# Patient Record
Sex: FEMALE | Race: OTHER | Employment: STUDENT | URBAN - METROPOLITAN AREA
[De-identification: names, ages, dates, MRNs, and addresses within clinical notes are randomized per-mention and may not be internally consistent; named-entity substitution may affect disease eponyms.]

---

## 2019-04-11 ENCOUNTER — HOSPITAL ENCOUNTER (EMERGENCY)
Facility: HOSPITAL | Age: 29
Discharge: HOME | End: 2019-04-11
Payer: COMMERCIAL

## 2019-04-11 ENCOUNTER — APPOINTMENT (OUTPATIENT)
Dept: ULTRASOUND IMAGING | Facility: HOSPITAL | Age: 29
End: 2019-04-11
Attending: NURSE PRACTITIONER
Payer: COMMERCIAL

## 2019-04-11 VITALS
OXYGEN SATURATION: 98 % | TEMPERATURE: 99 F | HEART RATE: 70 BPM | SYSTOLIC BLOOD PRESSURE: 91 MMHG | WEIGHT: 140 LBS | HEIGHT: 65 IN | DIASTOLIC BLOOD PRESSURE: 67 MMHG | BODY MASS INDEX: 23.32 KG/M2 | RESPIRATION RATE: 18 BRPM

## 2019-04-11 DIAGNOSIS — D50.9 IRON DEFICIENCY ANEMIA, UNSPECIFIED IRON DEFICIENCY ANEMIA TYPE: Primary | ICD-10-CM
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
## 2019-04-11 DIAGNOSIS — O26.891 ABDOMINAL PAIN DURING PREGNANCY IN FIRST TRIMESTER: ICD-10-CM

## 2019-04-11 DIAGNOSIS — R10.9 ABDOMINAL PAIN DURING PREGNANCY IN FIRST TRIMESTER: ICD-10-CM

## 2019-04-11 PROCEDURE — 81001 URINALYSIS AUTO W/SCOPE: CPT | Performed by: NURSE PRACTITIONER

## 2019-04-11 PROCEDURE — 81025 URINE PREGNANCY TEST: CPT

## 2019-04-11 PROCEDURE — 99284 EMERGENCY DEPT VISIT MOD MDM: CPT

## 2019-04-11 PROCEDURE — 85060 BLOOD SMEAR INTERPRETATION: CPT | Performed by: NURSE PRACTITIONER

## 2019-04-11 PROCEDURE — 76801 OB US < 14 WKS SINGLE FETUS: CPT | Performed by: NURSE PRACTITIONER

## 2019-04-11 PROCEDURE — 36415 COLL VENOUS BLD VENIPUNCTURE: CPT

## 2019-04-11 PROCEDURE — 85025 COMPLETE CBC W/AUTO DIFF WBC: CPT | Performed by: NURSE PRACTITIONER

## 2019-04-11 PROCEDURE — 86901 BLOOD TYPING SEROLOGIC RH(D): CPT | Performed by: NURSE PRACTITIONER

## 2019-04-11 PROCEDURE — 76817 TRANSVAGINAL US OBSTETRIC: CPT | Performed by: NURSE PRACTITIONER

## 2019-04-11 PROCEDURE — 84702 CHORIONIC GONADOTROPIN TEST: CPT | Performed by: NURSE PRACTITIONER

## 2019-04-11 PROCEDURE — 86900 BLOOD TYPING SEROLOGIC ABO: CPT | Performed by: NURSE PRACTITIONER

## 2019-04-11 RX ORDER — CHOLECALCIFEROL (VITAMIN D3) 25 MCG
1 TABLET,CHEWABLE ORAL DAILY
Qty: 30 CAPSULE | Refills: 0 | Status: SHIPPED | OUTPATIENT
Start: 2019-04-11 | End: 2019-05-11
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
## 2019-04-20 ENCOUNTER — OFFICE VISIT (OUTPATIENT)
Dept: OBGYN CLINIC | Facility: CLINIC | Age: 29
End: 2019-04-20
Payer: MEDICAID

## 2019-04-20 VITALS
DIASTOLIC BLOOD PRESSURE: 68 MMHG | HEIGHT: 65.25 IN | BODY MASS INDEX: 21.86 KG/M2 | HEART RATE: 62 BPM | WEIGHT: 132.81 LBS | SYSTOLIC BLOOD PRESSURE: 107 MMHG

## 2019-04-20 DIAGNOSIS — Z34.81 ENCOUNTER FOR SUPERVISION OF OTHER NORMAL PREGNANCY IN FIRST TRIMESTER: Primary | ICD-10-CM

## 2019-04-20 DIAGNOSIS — D50.9 MICROCYTIC ANEMIA: ICD-10-CM

## 2019-04-20 DIAGNOSIS — R71.8 RED BLOOD CELL MORPHOLOGY ABNORMALITY: ICD-10-CM

## 2019-04-20 PROCEDURE — 99202 OFFICE O/P NEW SF 15 MIN: CPT | Performed by: ADVANCED PRACTICE MIDWIFE

## 2019-04-20 PROCEDURE — 81002 URINALYSIS NONAUTO W/O SCOPE: CPT | Performed by: ADVANCED PRACTICE MIDWIFE

## 2019-04-23 ENCOUNTER — TELEPHONE (OUTPATIENT)
Dept: OBGYN CLINIC | Facility: CLINIC | Age: 29
End: 2019-04-23

## 2019-04-23 ENCOUNTER — NURSE ONLY (OUTPATIENT)
Dept: OBGYN CLINIC | Facility: CLINIC | Age: 29
End: 2019-04-23
Payer: MEDICAID

## 2019-04-23 VITALS — WEIGHT: 132.81 LBS | BODY MASS INDEX: 22 KG/M2

## 2019-04-23 DIAGNOSIS — Z3A.10 10 WEEKS GESTATION OF PREGNANCY: Primary | ICD-10-CM

## 2019-04-23 PROBLEM — D57.3 SICKLE CELL TRAIT (HCC): Status: ACTIVE | Noted: 2019-04-23

## 2019-04-23 PROBLEM — D57.3 SICKLE CELL TRAIT: Status: ACTIVE | Noted: 2019-04-23

## 2019-04-23 NOTE — PROGRESS NOTES
Na Lee is a 34year old , current EGA of 10w2d presents for amenorrhea. Reports LMP as 2/10/19 with regular cycles. This is a planned pregnancy and patient is excited.  Here with partner  Pt desires natural childbirth with minimal inter

## 2019-04-23 NOTE — TELEPHONE ENCOUNTER
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  Placed on BR desk for review

## 2019-04-23 NOTE — PROGRESS NOTES
Pt is here today for OB RN education visit, educational material reviewed. Pt verbalized understanding. Orders placed for NOB labs including HCV. Pt states her last Pap smear was about 4 years ago and was normal. Pt declines genetic testing.  Pt completed E

## 2019-05-07 ENCOUNTER — ROUTINE PRENATAL (OUTPATIENT)
Dept: OBGYN CLINIC | Facility: CLINIC | Age: 29
End: 2019-05-07
Payer: MEDICAID

## 2019-05-07 VITALS
BODY MASS INDEX: 22.14 KG/M2 | SYSTOLIC BLOOD PRESSURE: 111 MMHG | HEIGHT: 65.25 IN | HEART RATE: 84 BPM | DIASTOLIC BLOOD PRESSURE: 73 MMHG | WEIGHT: 134.5 LBS

## 2019-05-07 DIAGNOSIS — Z34.81 PRENATAL CARE, SUBSEQUENT PREGNANCY, FIRST TRIMESTER: Primary | ICD-10-CM

## 2019-05-07 PROCEDURE — 99212 OFFICE O/P EST SF 10 MIN: CPT | Performed by: ADVANCED PRACTICE MIDWIFE

## 2019-05-07 PROCEDURE — 81002 URINALYSIS NONAUTO W/O SCOPE: CPT | Performed by: ADVANCED PRACTICE MIDWIFE

## 2019-05-07 NOTE — PROGRESS NOTES
PE/PAP deferred until next visit as pt is uninsured. Pt denies complaints. Waiting to see Hematology until insurance is active. Declines 1st T genetic screen.   Rev warnings/when to call

## 2019-05-23 ENCOUNTER — LAB ENCOUNTER (OUTPATIENT)
Dept: LAB | Facility: HOSPITAL | Age: 29
End: 2019-05-23
Attending: ADVANCED PRACTICE MIDWIFE
Payer: COMMERCIAL

## 2019-05-23 ENCOUNTER — TELEPHONE (OUTPATIENT)
Dept: OBGYN CLINIC | Facility: CLINIC | Age: 29
End: 2019-05-23

## 2019-05-23 DIAGNOSIS — Z3A.10 10 WEEKS GESTATION OF PREGNANCY: ICD-10-CM

## 2019-05-23 PROCEDURE — 86803 HEPATITIS C AB TEST: CPT

## 2019-05-23 PROCEDURE — 87086 URINE CULTURE/COLONY COUNT: CPT

## 2019-05-23 PROCEDURE — 86901 BLOOD TYPING SEROLOGIC RH(D): CPT

## 2019-05-23 PROCEDURE — 87389 HIV-1 AG W/HIV-1&-2 AB AG IA: CPT

## 2019-05-23 PROCEDURE — 36415 COLL VENOUS BLD VENIPUNCTURE: CPT

## 2019-05-23 PROCEDURE — 85025 COMPLETE CBC W/AUTO DIFF WBC: CPT

## 2019-05-23 PROCEDURE — 86850 RBC ANTIBODY SCREEN: CPT

## 2019-05-23 PROCEDURE — 86780 TREPONEMA PALLIDUM: CPT

## 2019-05-23 PROCEDURE — 86762 RUBELLA ANTIBODY: CPT

## 2019-05-23 PROCEDURE — 86900 BLOOD TYPING SEROLOGIC ABO: CPT

## 2019-05-23 PROCEDURE — 87340 HEPATITIS B SURFACE AG IA: CPT

## 2019-05-24 ENCOUNTER — PATIENT MESSAGE (OUTPATIENT)
Dept: OBGYN CLINIC | Facility: CLINIC | Age: 29
End: 2019-05-24

## 2019-05-24 PROBLEM — Z34.90 PREGNANCY: Status: ACTIVE | Noted: 2019-05-24

## 2019-05-24 PROBLEM — Z34.90 PREGNANCY (HCC): Status: ACTIVE | Noted: 2019-05-24

## 2019-06-04 ENCOUNTER — ROUTINE PRENATAL (OUTPATIENT)
Dept: OBGYN CLINIC | Facility: CLINIC | Age: 29
End: 2019-06-04
Payer: COMMERCIAL

## 2019-06-04 VITALS
DIASTOLIC BLOOD PRESSURE: 67 MMHG | HEIGHT: 65.25 IN | SYSTOLIC BLOOD PRESSURE: 104 MMHG | WEIGHT: 138.25 LBS | HEART RATE: 64 BPM | BODY MASS INDEX: 22.76 KG/M2

## 2019-06-04 DIAGNOSIS — O99.019 ANTEPARTUM ANEMIA: ICD-10-CM

## 2019-06-04 DIAGNOSIS — Z34.82 PRENATAL CARE, SUBSEQUENT PREGNANCY, SECOND TRIMESTER: Primary | ICD-10-CM

## 2019-06-04 DIAGNOSIS — N89.8 VAGINAL LESION: ICD-10-CM

## 2019-06-04 DIAGNOSIS — D57.3 SICKLE CELL TRAIT (HCC): ICD-10-CM

## 2019-06-04 PROCEDURE — 81002 URINALYSIS NONAUTO W/O SCOPE: CPT | Performed by: ADVANCED PRACTICE MIDWIFE

## 2019-06-04 RX ORDER — GLYCERIN/MINERAL OIL
LOTION (ML) TOPICAL
COMMUNITY

## 2019-06-05 ENCOUNTER — TELEPHONE (OUTPATIENT)
Dept: OBGYN CLINIC | Facility: CLINIC | Age: 29
End: 2019-06-05

## 2019-06-05 PROBLEM — N89.8 VAGINAL LESION: Status: ACTIVE | Noted: 2019-06-05

## 2019-06-05 NOTE — TELEPHONE ENCOUNTER
Please notify patient that she should schedule her Hematology consult if she now has insurance and also schedule ultrasound with MFM for ~20 weeks.     Labs from yesterday not resulted yet, will let her know when we receive

## 2019-06-05 NOTE — TELEPHONE ENCOUNTER
Left detailed message for pt advising of BR instructions & phone #'s to schedule hematology consult & 20wk u/s

## 2019-06-05 NOTE — PROGRESS NOTES
Pt feeling well. No concerns. Declined genetic screening. Pt reports she tried to have intercourse last night but stopped because it was painful. On exam, small 5mm abrasion/shallow ulceration noted at introitus. Tender to palpation.   Neither pt or part

## 2019-06-05 NOTE — TELEPHONE ENCOUNTER
Lm for pt advising that results are still in process & may not be back until tomorrow.  Pt to call office Thursday afternoon if she has not heard from us

## 2019-06-06 ENCOUNTER — TELEPHONE (OUTPATIENT)
Dept: OBGYN CLINIC | Facility: CLINIC | Age: 29
End: 2019-06-06

## 2019-06-06 PROBLEM — R87.610 ASCUS OF CERVIX WITH NEGATIVE HIGH RISK HPV: Status: ACTIVE | Noted: 2019-06-06

## 2019-06-06 PROBLEM — N89.8 VAGINAL LESION: Status: RESOLVED | Noted: 2019-06-05 | Resolved: 2019-06-06

## 2019-06-06 NOTE — TELEPHONE ENCOUNTER
----- Message from MEGAN Tamez sent at 6/6/2019 10:48 AM CDT -----  Please notify patient that HSV culture and GCCT are negative. Is the tender area of vagina feeling better? If she gets and more painful vaginal areas should notify us.

## 2019-06-06 NOTE — TELEPHONE ENCOUNTER
Spoke with pt advised of lab results and BR's rec's. Pt states she has not had intercourse since she last saw BR so she does not know if tender area of vagina is better or worse. Advised she needs to notify us if it gets worse and has more painful areas.  P

## 2019-06-11 ENCOUNTER — APPOINTMENT (OUTPATIENT)
Dept: HEMATOLOGY/ONCOLOGY | Facility: HOSPITAL | Age: 29
End: 2019-06-11
Attending: INTERNAL MEDICINE
Payer: COMMERCIAL

## 2019-06-17 ENCOUNTER — TELEPHONE (OUTPATIENT)
Dept: OBGYN CLINIC | Facility: CLINIC | Age: 29
End: 2019-06-17

## 2019-06-18 ENCOUNTER — TELEPHONE (OUTPATIENT)
Dept: HEMATOLOGY/ONCOLOGY | Facility: HOSPITAL | Age: 29
End: 2019-06-18

## 2019-06-18 ENCOUNTER — OFFICE VISIT (OUTPATIENT)
Dept: HEMATOLOGY/ONCOLOGY | Facility: HOSPITAL | Age: 29
End: 2019-06-18
Attending: INTERNAL MEDICINE
Payer: COMMERCIAL

## 2019-06-18 ENCOUNTER — APPOINTMENT (OUTPATIENT)
Dept: LAB | Facility: HOSPITAL | Age: 29
End: 2019-06-18
Attending: INTERNAL MEDICINE
Payer: COMMERCIAL

## 2019-06-18 VITALS
OXYGEN SATURATION: 100 % | SYSTOLIC BLOOD PRESSURE: 108 MMHG | TEMPERATURE: 98 F | DIASTOLIC BLOOD PRESSURE: 56 MMHG | HEART RATE: 70 BPM | RESPIRATION RATE: 18 BRPM

## 2019-06-18 DIAGNOSIS — Z3A.19 19 WEEKS GESTATION OF PREGNANCY: ICD-10-CM

## 2019-06-18 DIAGNOSIS — D64.89 ANEMIA DUE TO OTHER CAUSE, NOT CLASSIFIED: ICD-10-CM

## 2019-06-18 DIAGNOSIS — D56.1 BETA-THALASSEMIA (HCC): Primary | ICD-10-CM

## 2019-06-18 DIAGNOSIS — D56.1 BETA-THALASSEMIA (HCC): ICD-10-CM

## 2019-06-18 DIAGNOSIS — D50.9 MICROCYTIC ANEMIA: ICD-10-CM

## 2019-06-18 PROCEDURE — 83020 HEMOGLOBIN ELECTROPHORESIS: CPT

## 2019-06-18 PROCEDURE — 36415 COLL VENOUS BLD VENIPUNCTURE: CPT

## 2019-06-18 PROCEDURE — 83021 HEMOGLOBIN CHROMOTOGRAPHY: CPT

## 2019-06-18 PROCEDURE — 82728 ASSAY OF FERRITIN: CPT

## 2019-06-18 PROCEDURE — 99243 OFF/OP CNSLTJ NEW/EST LOW 30: CPT | Performed by: INTERNAL MEDICINE

## 2019-06-18 NOTE — CONSULTS
Cancer Center History and Physical    Patient Name: Francesca Knox   YOB: 1990   Medical Record Number: [id number]   CSN: [id number]   Attending Physician:  Jess Kaplan MD       Date of Visit: 6/18/2019     Chief Complaint:  Anemia     Hist Maternal Aunt        Social History:  Marital status: one kid, 2 miscarriages. With long-term partner. Smoking: None  ETOH: None  Work: HR; and student.      Current Medications:    Current Outpatient Medications:   •  Prenatal Vit-Fe Fumarate-FA (KRISTINA PREN AST 18 05/20/2013    BILT 0.5 05/20/2013    ALB 4.2 05/20/2013    TP 8.2 05/20/2013       Impression and Plan  1.  Microcytic anemia  Discussed with the patient that findings are likely consistent with underlying hemoglobinopathy such as thalassemia trait o

## 2019-06-19 ENCOUNTER — TELEPHONE (OUTPATIENT)
Dept: HEMATOLOGY/ONCOLOGY | Facility: HOSPITAL | Age: 29
End: 2019-06-19

## 2019-06-19 NOTE — TELEPHONE ENCOUNTER
Pt returned call. Per Dr Chasidy Lomax, iron levels are on the low side. Wants patient to continue taking the oral iron. Pt verbalized understanding.

## 2019-07-03 ENCOUNTER — TELEPHONE (OUTPATIENT)
Dept: OBGYN CLINIC | Facility: CLINIC | Age: 29
End: 2019-07-03

## 2019-07-03 ENCOUNTER — ROUTINE PRENATAL (OUTPATIENT)
Dept: OBGYN CLINIC | Facility: CLINIC | Age: 29
End: 2019-07-03
Payer: COMMERCIAL

## 2019-07-03 VITALS
SYSTOLIC BLOOD PRESSURE: 102 MMHG | DIASTOLIC BLOOD PRESSURE: 75 MMHG | BODY MASS INDEX: 23.4 KG/M2 | HEART RATE: 79 BPM | WEIGHT: 142.13 LBS | HEIGHT: 65.25 IN

## 2019-07-03 DIAGNOSIS — Z34.82 PRENATAL CARE, SUBSEQUENT PREGNANCY, SECOND TRIMESTER: Primary | ICD-10-CM

## 2019-07-03 LAB
APPEARANCE: CLEAR
MULTISTIX LOT#: NORMAL NUMERIC
PH, URINE: 6 (ref 4.5–8)
SPECIFIC GRAVITY: 1.02 (ref 1–1.03)
URINE-COLOR: YELLOW
UROBILINOGEN,SEMI-QN: 0.2 MG/DL (ref 0–1.9)

## 2019-07-03 PROCEDURE — 81002 URINALYSIS NONAUTO W/O SCOPE: CPT | Performed by: ADVANCED PRACTICE MIDWIFE

## 2019-07-03 NOTE — TELEPHONE ENCOUNTER
Spoke with pt and advised nobody from our office has called her. Pt agreed and voiced understanding.

## 2019-07-05 ENCOUNTER — HOSPITAL ENCOUNTER (OUTPATIENT)
Dept: PERINATAL CARE | Facility: HOSPITAL | Age: 29
Discharge: HOME | End: 2019-07-05
Attending: OBSTETRICS & GYNECOLOGY
Payer: COMMERCIAL

## 2019-07-05 ENCOUNTER — HOSPITAL ENCOUNTER (OUTPATIENT)
Dept: PERINATAL CARE | Facility: HOSPITAL | Age: 29
Discharge: HOME | End: 2019-07-05
Attending: ADVANCED PRACTICE MIDWIFE
Payer: COMMERCIAL

## 2019-07-05 VITALS
HEART RATE: 79 BPM | SYSTOLIC BLOOD PRESSURE: 105 MMHG | BODY MASS INDEX: 23 KG/M2 | DIASTOLIC BLOOD PRESSURE: 55 MMHG | WEIGHT: 142 LBS

## 2019-07-05 DIAGNOSIS — Z36.3 SCREENING, ANTENATAL, FOR MALFORMATION BY ULTRASOUND: Primary | ICD-10-CM

## 2019-07-05 DIAGNOSIS — Z36.3 SCREENING, ANTENATAL, FOR MALFORMATION BY ULTRASOUND: ICD-10-CM

## 2019-07-05 PROCEDURE — 99201 OFFICE/OUTPT VISIT,NEW,LEVL I: CPT | Performed by: OBSTETRICS & GYNECOLOGY

## 2019-07-05 PROCEDURE — 76805 OB US >/= 14 WKS SNGL FETUS: CPT | Performed by: OBSTETRICS & GYNECOLOGY

## 2019-07-07 NOTE — PROGRESS NOTES
LATE ENTRY - PATIENT SEEN ON 7/5/19    /55   Pulse 79   Wt 142 lb (64.4 kg)   LMP 02/10/2019 (Exact Date)   BMI 23.45 kg/m²        STANDARD OBSTETRIC ULTRASOUND REPORT   See imaging tab for complete consultation / ultrasound report      Fetal Heart R

## 2019-07-16 ENCOUNTER — APPOINTMENT (OUTPATIENT)
Dept: HEMATOLOGY/ONCOLOGY | Facility: HOSPITAL | Age: 29
End: 2019-07-16
Attending: INTERNAL MEDICINE
Payer: COMMERCIAL

## 2019-07-30 ENCOUNTER — ROUTINE PRENATAL (OUTPATIENT)
Dept: OBGYN CLINIC | Facility: CLINIC | Age: 29
End: 2019-07-30
Payer: COMMERCIAL

## 2019-07-30 VITALS
HEIGHT: 65.25 IN | BODY MASS INDEX: 23.95 KG/M2 | HEART RATE: 66 BPM | WEIGHT: 145.5 LBS | DIASTOLIC BLOOD PRESSURE: 63 MMHG | SYSTOLIC BLOOD PRESSURE: 98 MMHG

## 2019-07-30 DIAGNOSIS — Z34.82 PRENATAL CARE, SUBSEQUENT PREGNANCY, SECOND TRIMESTER: Primary | ICD-10-CM

## 2019-07-30 PROCEDURE — 81002 URINALYSIS NONAUTO W/O SCOPE: CPT | Performed by: ADVANCED PRACTICE MIDWIFE

## 2019-07-30 NOTE — PROGRESS NOTES
Active fetus  No signs signs of PTL. Reviewed S&S of PTL  Warning signs reviewed  All questions answered. Recommended liquid iron supplement.

## 2019-08-17 ENCOUNTER — TELEPHONE (OUTPATIENT)
Dept: OBGYN CLINIC | Facility: CLINIC | Age: 29
End: 2019-08-17

## 2019-08-17 ENCOUNTER — ROUTINE PRENATAL (OUTPATIENT)
Dept: OBGYN CLINIC | Facility: CLINIC | Age: 29
End: 2019-08-17
Payer: COMMERCIAL

## 2019-08-17 VITALS
SYSTOLIC BLOOD PRESSURE: 105 MMHG | HEART RATE: 69 BPM | DIASTOLIC BLOOD PRESSURE: 69 MMHG | BODY MASS INDEX: 24 KG/M2 | WEIGHT: 146.63 LBS

## 2019-08-17 DIAGNOSIS — Z34.82 ENCOUNTER FOR SUPERVISION OF OTHER NORMAL PREGNANCY IN SECOND TRIMESTER: Primary | ICD-10-CM

## 2019-08-17 DIAGNOSIS — N89.8 VAGINAL DISCHARGE DURING PREGNANCY IN SECOND TRIMESTER: ICD-10-CM

## 2019-08-17 DIAGNOSIS — O26.892 VAGINAL DISCHARGE DURING PREGNANCY IN SECOND TRIMESTER: ICD-10-CM

## 2019-08-17 LAB
APPEARANCE: CLEAR
FIBRONECTIN FETAL SPEC QL: NEGATIVE
MULTISTIX LOT#: NORMAL NUMERIC
PH, URINE: 7 (ref 4.5–8)
SPECIFIC GRAVITY: 1.01 (ref 1–1.03)
URINE-COLOR: YELLOW
UROBILINOGEN,SEMI-QN: 0.2 MG/DL (ref 0–1.9)

## 2019-08-17 PROCEDURE — 81002 URINALYSIS NONAUTO W/O SCOPE: CPT | Performed by: ADVANCED PRACTICE MIDWIFE

## 2019-08-17 NOTE — TELEPHONE ENCOUNTER
Call from answering service. Reports gush of warm fluid 1 hr ago that soaked her underwear. None since. Not normal vag discharge per pt. Unsure of color as she is wearing pink underwear. When asked if it had an odor, pt states \"it smells like underwear. \"

## 2019-08-18 NOTE — PROGRESS NOTES
Reviewed RN note. Speculum exam reveals copious discharge; cervix closed, long. FFN obtained no recent intercourse. Vaginal cultures sent. Reviewed danger signs.

## 2019-08-19 ENCOUNTER — TELEPHONE (OUTPATIENT)
Dept: OBGYN CLINIC | Facility: CLINIC | Age: 29
End: 2019-08-19

## 2019-08-19 LAB
GENITAL VAGINOSIS SCREEN: NEGATIVE
TRICHOMONAS SCREEN: NEGATIVE

## 2019-08-19 NOTE — TELEPHONE ENCOUNTER
----- Message from Laura Levi CNM sent at 8/19/2019  4:56 PM CDT -----  Normal lab results. Negative FFN. Please call to inform.

## 2019-08-30 ENCOUNTER — LAB ENCOUNTER (OUTPATIENT)
Dept: LAB | Facility: HOSPITAL | Age: 29
End: 2019-08-30
Attending: ADVANCED PRACTICE MIDWIFE
Payer: COMMERCIAL

## 2019-08-30 DIAGNOSIS — D64.89 ANEMIA DUE TO OTHER CAUSE, NOT CLASSIFIED: ICD-10-CM

## 2019-08-30 DIAGNOSIS — Z34.82 PRENATAL CARE, SUBSEQUENT PREGNANCY, SECOND TRIMESTER: ICD-10-CM

## 2019-08-30 LAB
BASOPHILS # BLD AUTO: 0.01 X10(3) UL (ref 0–0.2)
BASOPHILS NFR BLD AUTO: 0.1 %
DEPRECATED RDW RBC AUTO: 40.7 FL (ref 35.1–46.3)
EOSINOPHIL # BLD AUTO: 0.2 X10(3) UL (ref 0–0.7)
EOSINOPHIL NFR BLD AUTO: 2.5 %
ERYTHROCYTE [DISTWIDTH] IN BLOOD BY AUTOMATED COUNT: 15.8 % (ref 11–15)
GLUCOSE 1H P GLC SERPL-MCNC: 119 MG/DL
HCT VFR BLD AUTO: 28.5 % (ref 35–48)
HGB BLD-MCNC: 9.2 G/DL (ref 12–16)
IMM GRANULOCYTES # BLD AUTO: 0.02 X10(3) UL (ref 0–1)
IMM GRANULOCYTES NFR BLD: 0.2 %
LYMPHOCYTES # BLD AUTO: 1.72 X10(3) UL (ref 1–4)
LYMPHOCYTES NFR BLD AUTO: 21.1 %
MCH RBC QN AUTO: 23.8 PG (ref 26–34)
MCHC RBC AUTO-ENTMCNC: 32.3 G/DL (ref 31–37)
MCV RBC AUTO: 73.6 FL (ref 80–100)
MONOCYTES # BLD AUTO: 0.52 X10(3) UL (ref 0.1–1)
MONOCYTES NFR BLD AUTO: 6.4 %
NEUTROPHILS # BLD AUTO: 5.68 X10 (3) UL (ref 1.5–7.7)
NEUTROPHILS # BLD AUTO: 5.68 X10(3) UL (ref 1.5–7.7)
NEUTROPHILS NFR BLD AUTO: 69.7 %
PLATELET # BLD AUTO: 223 10(3)UL (ref 150–450)
RBC # BLD AUTO: 3.87 X10(6)UL (ref 3.8–5.3)
WBC # BLD AUTO: 8.2 X10(3) UL (ref 4–11)

## 2019-08-30 PROCEDURE — 86780 TREPONEMA PALLIDUM: CPT

## 2019-08-30 PROCEDURE — 36415 COLL VENOUS BLD VENIPUNCTURE: CPT

## 2019-08-30 PROCEDURE — 85025 COMPLETE CBC W/AUTO DIFF WBC: CPT

## 2019-08-30 PROCEDURE — 82950 GLUCOSE TEST: CPT

## 2019-08-30 PROCEDURE — 87389 HIV-1 AG W/HIV-1&-2 AB AG IA: CPT

## 2019-08-31 ENCOUNTER — TELEPHONE (OUTPATIENT)
Dept: OBGYN CLINIC | Facility: CLINIC | Age: 29
End: 2019-08-31

## 2019-08-31 NOTE — TELEPHONE ENCOUNTER
----- Message from Audrey Blake CNM sent at 8/31/2019  8:51 AM CDT -----  Pt is very anemic  She should increase her iron to 2-3 x daily 325 mg. Additional blood testing ordered.   Will also need a repeat CBC in 1 month  All other testing normal

## 2019-09-03 LAB — T PALLIDUM AB SER QL: NEGATIVE

## 2019-09-04 ENCOUNTER — TELEPHONE (OUTPATIENT)
Dept: OBGYN CLINIC | Facility: CLINIC | Age: 29
End: 2019-09-04

## 2019-09-07 ENCOUNTER — ROUTINE PRENATAL (OUTPATIENT)
Dept: OBGYN CLINIC | Facility: CLINIC | Age: 29
End: 2019-09-07
Payer: COMMERCIAL

## 2019-09-07 VITALS
BODY MASS INDEX: 25 KG/M2 | WEIGHT: 150 LBS | HEART RATE: 82 BPM | DIASTOLIC BLOOD PRESSURE: 63 MMHG | SYSTOLIC BLOOD PRESSURE: 91 MMHG

## 2019-09-07 DIAGNOSIS — Z34.83 ENCOUNTER FOR SUPERVISION OF OTHER NORMAL PREGNANCY IN THIRD TRIMESTER: Primary | ICD-10-CM

## 2019-09-07 LAB
APPEARANCE: CLEAR
MULTISTIX LOT#: NORMAL NUMERIC
PH, URINE: 6 (ref 4.5–8)
SPECIFIC GRAVITY: 1.01 (ref 1–1.03)
URINE-COLOR: YELLOW
UROBILINOGEN,SEMI-QN: 0 MG/DL (ref 0–1.9)

## 2019-09-07 PROCEDURE — 81002 URINALYSIS NONAUTO W/O SCOPE: CPT | Performed by: ADVANCED PRACTICE MIDWIFE

## 2019-09-24 ENCOUNTER — ROUTINE PRENATAL (OUTPATIENT)
Dept: OBGYN CLINIC | Facility: CLINIC | Age: 29
End: 2019-09-24
Payer: COMMERCIAL

## 2019-09-24 VITALS
DIASTOLIC BLOOD PRESSURE: 69 MMHG | BODY MASS INDEX: 25.08 KG/M2 | WEIGHT: 152.38 LBS | SYSTOLIC BLOOD PRESSURE: 113 MMHG | HEART RATE: 76 BPM | HEIGHT: 65.25 IN

## 2019-09-24 DIAGNOSIS — Z34.83 PRENATAL CARE, SUBSEQUENT PREGNANCY, THIRD TRIMESTER: Primary | ICD-10-CM

## 2019-09-24 LAB
APPEARANCE: CLEAR
MULTISTIX LOT#: NORMAL NUMERIC
PH, URINE: 7 (ref 4.5–8)
SPECIFIC GRAVITY: 1.02 (ref 1–1.03)
URINE-COLOR: YELLOW
UROBILINOGEN,SEMI-QN: 0.2 MG/DL (ref 0–1.9)

## 2019-09-24 PROCEDURE — 81002 URINALYSIS NONAUTO W/O SCOPE: CPT | Performed by: ADVANCED PRACTICE MIDWIFE

## 2019-09-24 NOTE — PROGRESS NOTES
Doing well. +FM. Rev warnings/when to call.   Pt requests to do FLU and TDAP at [facility]

## 2019-10-18 ENCOUNTER — ROUTINE PRENATAL (OUTPATIENT)
Dept: OBGYN CLINIC | Facility: CLINIC | Age: 29
End: 2019-10-18
Payer: COMMERCIAL

## 2019-10-18 ENCOUNTER — APPOINTMENT (OUTPATIENT)
Dept: LAB | Facility: HOSPITAL | Age: 29
End: 2019-10-18
Attending: ADVANCED PRACTICE MIDWIFE
Payer: COMMERCIAL

## 2019-10-18 VITALS
DIASTOLIC BLOOD PRESSURE: 68 MMHG | WEIGHT: 157.81 LBS | BODY MASS INDEX: 26 KG/M2 | SYSTOLIC BLOOD PRESSURE: 112 MMHG | HEART RATE: 92 BPM

## 2019-10-18 DIAGNOSIS — O99.013 ANEMIA DURING PREGNANCY IN THIRD TRIMESTER: ICD-10-CM

## 2019-10-18 DIAGNOSIS — Z34.83 ENCOUNTER FOR SUPERVISION OF OTHER NORMAL PREGNANCY IN THIRD TRIMESTER: Primary | ICD-10-CM

## 2019-10-18 DIAGNOSIS — Z23 FLU VACCINE NEED: ICD-10-CM

## 2019-10-18 PROCEDURE — 36415 COLL VENOUS BLD VENIPUNCTURE: CPT

## 2019-10-18 PROCEDURE — 85027 COMPLETE CBC AUTOMATED: CPT

## 2019-10-18 PROCEDURE — 90686 IIV4 VACC NO PRSV 0.5 ML IM: CPT | Performed by: ADVANCED PRACTICE MIDWIFE

## 2019-10-18 PROCEDURE — 90715 TDAP VACCINE 7 YRS/> IM: CPT | Performed by: ADVANCED PRACTICE MIDWIFE

## 2019-10-18 PROCEDURE — 90472 IMMUNIZATION ADMIN EACH ADD: CPT | Performed by: ADVANCED PRACTICE MIDWIFE

## 2019-10-18 PROCEDURE — 90471 IMMUNIZATION ADMIN: CPT | Performed by: ADVANCED PRACTICE MIDWIFE

## 2019-10-18 PROCEDURE — 81002 URINALYSIS NONAUTO W/O SCOPE: CPT | Performed by: ADVANCED PRACTICE MIDWIFE

## 2019-10-19 PROBLEM — D56.3 BETA THALASSEMIA, HETEROZYGOUS: Status: ACTIVE | Noted: 2019-10-19

## 2019-10-19 NOTE — PROGRESS NOTES
Pt reports she has not been to Hematology again since initial consult. Has been taking FE+ once per day. Has been diagnosed with likely Beta Thalassemia trait, though DNA testing not done.   To get CBC redrawn today and will then determine if needs to ret

## 2019-10-21 ENCOUNTER — TELEPHONE (OUTPATIENT)
Dept: OBGYN CLINIC | Facility: CLINIC | Age: 29
End: 2019-10-21

## 2019-10-21 ENCOUNTER — TELEPHONE (OUTPATIENT)
Dept: HEMATOLOGY/ONCOLOGY | Facility: HOSPITAL | Age: 29
End: 2019-10-21

## 2019-10-21 DIAGNOSIS — D56.1 BETA-THALASSEMIA (HCC): Primary | ICD-10-CM

## 2019-10-21 DIAGNOSIS — D64.89 ANEMIA DUE TO OTHER CAUSE, NOT CLASSIFIED: ICD-10-CM

## 2019-10-21 NOTE — TELEPHONE ENCOUNTER
----- Message from MEGAN Montiel sent at 10/19/2019  5:03 PM CDT -----  Please notify patient that she needs to go back and see Hematology as soon as possible - according to notes she was supposed to be seeing them monthly, but that hasn't been ha

## 2019-10-21 NOTE — TELEPHONE ENCOUNTER
Returned call to Autumn to let her know that Dr Rafa Epperson wants her to get 2 doses of IV iron. Will order today. Once it's authorized we will get her in asap for the infusions and MD visit.   Pt instructed to have another CBC and ferritin drawn either today or

## 2019-10-24 ENCOUNTER — LAB ENCOUNTER (OUTPATIENT)
Dept: LAB | Facility: HOSPITAL | Age: 29
End: 2019-10-24
Attending: INTERNAL MEDICINE
Payer: COMMERCIAL

## 2019-10-24 DIAGNOSIS — D64.89 ANEMIA DUE TO OTHER CAUSE, NOT CLASSIFIED: ICD-10-CM

## 2019-10-24 DIAGNOSIS — D56.1 BETA-THALASSEMIA (HCC): ICD-10-CM

## 2019-10-24 PROCEDURE — 85025 COMPLETE CBC W/AUTO DIFF WBC: CPT

## 2019-10-24 PROCEDURE — 82728 ASSAY OF FERRITIN: CPT

## 2019-10-24 PROCEDURE — 36415 COLL VENOUS BLD VENIPUNCTURE: CPT

## 2019-11-01 ENCOUNTER — TELEPHONE (OUTPATIENT)
Dept: HEMATOLOGY/ONCOLOGY | Facility: HOSPITAL | Age: 29
End: 2019-11-01

## 2019-11-01 ENCOUNTER — ROUTINE PRENATAL (OUTPATIENT)
Dept: OBGYN CLINIC | Facility: CLINIC | Age: 29
End: 2019-11-01
Payer: COMMERCIAL

## 2019-11-01 VITALS
HEART RATE: 88 BPM | SYSTOLIC BLOOD PRESSURE: 107 MMHG | DIASTOLIC BLOOD PRESSURE: 69 MMHG | WEIGHT: 158.5 LBS | HEIGHT: 65.25 IN | BODY MASS INDEX: 26.09 KG/M2

## 2019-11-01 DIAGNOSIS — Z34.83 PRENATAL CARE, SUBSEQUENT PREGNANCY, THIRD TRIMESTER: Primary | ICD-10-CM

## 2019-11-01 PROCEDURE — 81002 URINALYSIS NONAUTO W/O SCOPE: CPT | Performed by: ADVANCED PRACTICE MIDWIFE

## 2019-11-01 NOTE — PROGRESS NOTES
Plan to get Iron infusions, they called her today to schedule. Advised to schedule ASAP. Baby active. Having some back pain, had some brownish spotting on Sunday. Last labor was 12 yrs ago, reports she slept and woke up when it was time to push.  Came in ni

## 2019-11-01 NOTE — TELEPHONE ENCOUNTER
Called Beena and let her know that her ferritin is low again. Autumn reports doubling her oral iron in the last week but she is still low despite. Reports more fatigue, denies shortness of breath. Pt is due to deliver her baby in 2 weeks.   Dr Karen Huff

## 2019-11-02 ENCOUNTER — HOSPITAL ENCOUNTER (OUTPATIENT)
Facility: HOSPITAL | Age: 29
Setting detail: OBSERVATION
Discharge: HOME | End: 2019-11-02
Attending: ADVANCED PRACTICE MIDWIFE | Admitting: OBSTETRICS & GYNECOLOGY
Payer: COMMERCIAL

## 2019-11-02 VITALS — TEMPERATURE: 98 F | RESPIRATION RATE: 16 BRPM

## 2019-11-02 PROCEDURE — 59025 FETAL NON-STRESS TEST: CPT | Performed by: ADVANCED PRACTICE MIDWIFE

## 2019-11-02 RX ORDER — ACETAMINOPHEN 325 MG/1
650 TABLET ORAL EVERY 6 HOURS PRN
Status: DISCONTINUED | OUTPATIENT
Start: 2019-11-02 | End: 2019-11-03

## 2019-11-03 NOTE — PROGRESS NOTES
Pt is a 34year old female admitted to TR2/TR2-A. Patient presents with:  R/o Labor     Pt is [id number] 37w6d intra-uterine pregnancy. History obtained, consents signed. Oriented to room, staff, and plan of care.

## 2019-11-03 NOTE — TRIAGE
[facility]      Triage Note    Fan Niño Patient Status:  Observation    3/8/1990 MRN [id number]   Location [address] [facility] Attending Julissa Beaver, [address] Day # 0 PCP Tamy Soriano Reason for visit: Pt C/O of back pain and contractions. Calista Faria RN  11/2/2019 11:15 PM  \  Physician Evaluation      NST Interpretation: Reactive    Disposition:   Discharged    Comments:    Hx of fast labor with minimal symptoms.   Patient cam

## 2019-11-05 ENCOUNTER — OFFICE VISIT (OUTPATIENT)
Dept: HEMATOLOGY/ONCOLOGY | Facility: HOSPITAL | Age: 29
End: 2019-11-05
Attending: INTERNAL MEDICINE
Payer: COMMERCIAL

## 2019-11-05 ENCOUNTER — TELEPHONE (OUTPATIENT)
Dept: OBGYN CLINIC | Facility: CLINIC | Age: 29
End: 2019-11-05

## 2019-11-05 VITALS
SYSTOLIC BLOOD PRESSURE: 90 MMHG | DIASTOLIC BLOOD PRESSURE: 73 MMHG | TEMPERATURE: 98 F | RESPIRATION RATE: 16 BRPM | OXYGEN SATURATION: 98 % | HEART RATE: 78 BPM

## 2019-11-05 DIAGNOSIS — D50.9 MICROCYTIC ANEMIA: Primary | ICD-10-CM

## 2019-11-05 PROBLEM — O99.820 GROUP B STREPTOCOCCAL CARRIAGE COMPLICATING PREGNANCY: Status: ACTIVE | Noted: 2019-11-05

## 2019-11-05 PROBLEM — O99.820 GROUP B STREPTOCOCCAL CARRIAGE COMPLICATING PREGNANCY (HCC): Status: ACTIVE | Noted: 2019-11-05

## 2019-11-05 PROCEDURE — 96365 THER/PROPH/DIAG IV INF INIT: CPT

## 2019-11-05 NOTE — TELEPHONE ENCOUNTER
Pt states she saw her GBS was positive. Reviewed the protocol & reassured pt it does not change her birth plan.  Pt verbalized an understanding & agrees w/ plan

## 2019-11-05 NOTE — PROGRESS NOTES
Pt arrived for iron infusion. Pt is 38 weeks pregnant. Injectafer information sheet given to pt to review. She offered no questions. Procedure explained to pt and she verbalized understanding. Injectafer given and pt observed x 30 minutes post infusion.

## 2019-11-08 ENCOUNTER — HOSPITAL ENCOUNTER (OUTPATIENT)
Facility: HOSPITAL | Age: 29
Setting detail: OBSERVATION
Discharge: HOME | End: 2019-11-08
Attending: ADVANCED PRACTICE MIDWIFE | Admitting: OBSTETRICS & GYNECOLOGY
Payer: COMMERCIAL

## 2019-11-08 ENCOUNTER — APPOINTMENT (OUTPATIENT)
Dept: HEMATOLOGY/ONCOLOGY | Facility: HOSPITAL | Age: 29
End: 2019-11-08
Payer: COMMERCIAL

## 2019-11-08 VITALS
TEMPERATURE: 98 F | DIASTOLIC BLOOD PRESSURE: 65 MMHG | SYSTOLIC BLOOD PRESSURE: 107 MMHG | BODY MASS INDEX: 26.01 KG/M2 | HEIGHT: 65.25 IN | HEART RATE: 77 BPM | WEIGHT: 158 LBS

## 2019-11-08 PROCEDURE — 59025 FETAL NON-STRESS TEST: CPT | Performed by: ADVANCED PRACTICE MIDWIFE

## 2019-11-08 RX ORDER — MELATONIN
325
COMMUNITY
End: 2021-12-12

## 2019-11-08 NOTE — TRIAGE
[facility]      Triage Note    Oc Garcia Patient Status:  Observation    3/8/1990 MRN [id number]   Location [address]  Attending MEGAN Denny   Hosp Day # 0 [facility] [address] [facility] Contractions: Irregular           Minutes Between Contractions: 5 min           Acoustic Stimulator: No           Nonstress Test Interpretation: Reactive           Nonstress Test Second Interpretation: Reactive          FHR Category: Category I           A

## 2019-11-08 NOTE — PROGRESS NOTES
GILBERT HUSSEIN. 34YEAR OLD  38 5/7 WEEKS GESTATION     RECEIVED TO TRIAGE RM #1  C/O CTX EVERY 8 MIN FOR  4 HOURS,  DENIES LOF/BLEEDING, STATES +FM. CONSENTS OBTAINED, ORIENTED TO THE [facility] POC REVIEWED, EFM APPLIED.

## 2019-11-12 ENCOUNTER — ROUTINE PRENATAL (OUTPATIENT)
Dept: OBGYN CLINIC | Facility: CLINIC | Age: 29
End: 2019-11-12
Payer: COMMERCIAL

## 2019-11-12 ENCOUNTER — OFFICE VISIT (OUTPATIENT)
Dept: HEMATOLOGY/ONCOLOGY | Facility: HOSPITAL | Age: 29
End: 2019-11-12
Attending: INTERNAL MEDICINE
Payer: COMMERCIAL

## 2019-11-12 VITALS
OXYGEN SATURATION: 99 % | RESPIRATION RATE: 16 BRPM | HEART RATE: 71 BPM | TEMPERATURE: 98 F | DIASTOLIC BLOOD PRESSURE: 65 MMHG | SYSTOLIC BLOOD PRESSURE: 109 MMHG

## 2019-11-12 VITALS
DIASTOLIC BLOOD PRESSURE: 73 MMHG | HEART RATE: 68 BPM | SYSTOLIC BLOOD PRESSURE: 115 MMHG | BODY MASS INDEX: 26 KG/M2 | WEIGHT: 158 LBS

## 2019-11-12 DIAGNOSIS — D50.9 MICROCYTIC ANEMIA: Primary | ICD-10-CM

## 2019-11-12 DIAGNOSIS — Z34.83 ENCOUNTER FOR SUPERVISION OF OTHER NORMAL PREGNANCY IN THIRD TRIMESTER: Primary | ICD-10-CM

## 2019-11-12 DIAGNOSIS — O48.0 POST-TERM PREGNANCY, 40-42 WEEKS OF GESTATION: ICD-10-CM

## 2019-11-12 PROCEDURE — 81002 URINALYSIS NONAUTO W/O SCOPE: CPT | Performed by: ADVANCED PRACTICE MIDWIFE

## 2019-11-12 PROCEDURE — 96374 THER/PROPH/DIAG INJ IV PUSH: CPT

## 2019-11-12 NOTE — PROGRESS NOTES
Patient arrives for injectafer 2 of 2. Reports the first one went well, denies any complaints. Patient is 39 weeks pregnant, reports she is actively having contractions. PIV started in left AC positive blood return noted.  Injectafer given over 15 minutes,

## 2019-11-13 ENCOUNTER — HOSPITAL ENCOUNTER (INPATIENT)
Facility: HOSPITAL | Age: 29
LOS: 2 days | Discharge: HOME | End: 2019-11-15
Attending: ADVANCED PRACTICE MIDWIFE | Admitting: OBSTETRICS & GYNECOLOGY
Payer: COMMERCIAL

## 2019-11-13 PROBLEM — Z34.90 PREGNANT (HCC): Status: ACTIVE | Noted: 2019-11-13

## 2019-11-13 PROBLEM — Z34.90 PREGNANT: Status: ACTIVE | Noted: 2019-11-13

## 2019-11-13 PROCEDURE — 59409 OBSTETRICAL CARE: CPT | Performed by: ADVANCED PRACTICE MIDWIFE

## 2019-11-13 RX ORDER — SODIUM CHLORIDE, SODIUM LACTATE, POTASSIUM CHLORIDE, CALCIUM CHLORIDE 600; 310; 30; 20 MG/100ML; MG/100ML; MG/100ML; MG/100ML
INJECTION, SOLUTION INTRAVENOUS CONTINUOUS
Status: DISCONTINUED | OUTPATIENT
Start: 2019-11-13 | End: 2019-11-13

## 2019-11-13 RX ORDER — IBUPROFEN 600 MG/1
600 TABLET ORAL EVERY 4 HOURS PRN
Status: DISCONTINUED | OUTPATIENT
Start: 2019-11-13 | End: 2019-11-15

## 2019-11-13 RX ORDER — LIDOCAINE HYDROCHLORIDE 10 MG/ML
30 INJECTION, SOLUTION EPIDURAL; INFILTRATION; INTRACAUDAL; PERINEURAL ONCE
Status: DISCONTINUED | OUTPATIENT
Start: 2019-11-13 | End: 2019-11-13 | Stop reason: HOSPADM

## 2019-11-13 RX ORDER — SIMETHICONE 80 MG
80 TABLET,CHEWABLE ORAL 3 TIMES DAILY PRN
Status: DISCONTINUED | OUTPATIENT
Start: 2019-11-13 | End: 2019-11-15

## 2019-11-13 RX ORDER — IBUPROFEN 400 MG/1
400 TABLET ORAL EVERY 4 HOURS PRN
Status: DISCONTINUED | OUTPATIENT
Start: 2019-11-13 | End: 2019-11-15

## 2019-11-13 RX ORDER — AMMONIA INHALANTS 0.04 G/.3ML
0.3 INHALANT RESPIRATORY (INHALATION) AS NEEDED
Status: DISCONTINUED | OUTPATIENT
Start: 2019-11-13 | End: 2019-11-15

## 2019-11-13 RX ORDER — DIAPER,BRIEF,INFANT-TODD,DISP
1 EACH MISCELLANEOUS EVERY 6 HOURS PRN
Status: DISCONTINUED | OUTPATIENT
Start: 2019-11-13 | End: 2019-11-15

## 2019-11-13 RX ORDER — SODIUM CHLORIDE 0.9 % (FLUSH) 0.9 %
10 SYRINGE (ML) INJECTION AS NEEDED
Status: DISCONTINUED | OUTPATIENT
Start: 2019-11-13 | End: 2019-11-13 | Stop reason: HOSPADM

## 2019-11-13 RX ORDER — IBUPROFEN 600 MG/1
600 TABLET ORAL ONCE AS NEEDED
Status: DISCONTINUED | OUTPATIENT
Start: 2019-11-13 | End: 2019-11-13 | Stop reason: HOSPADM

## 2019-11-13 RX ORDER — ONDANSETRON 2 MG/ML
4 INJECTION INTRAMUSCULAR; INTRAVENOUS EVERY 6 HOURS PRN
Status: DISCONTINUED | OUTPATIENT
Start: 2019-11-13 | End: 2019-11-13 | Stop reason: HOSPADM

## 2019-11-13 RX ORDER — AMMONIA INHALANTS 0.04 G/.3ML
0.3 INHALANT RESPIRATORY (INHALATION) AS NEEDED
Status: DISCONTINUED | OUTPATIENT
Start: 2019-11-13 | End: 2019-11-13 | Stop reason: HOSPADM

## 2019-11-13 RX ORDER — DOCUSATE SODIUM 100 MG/1
100 CAPSULE, LIQUID FILLED ORAL 2 TIMES DAILY
Status: DISCONTINUED | OUTPATIENT
Start: 2019-11-13 | End: 2019-11-15

## 2019-11-13 RX ORDER — CHOLECALCIFEROL (VITAMIN D3) 25 MCG
1 TABLET,CHEWABLE ORAL DAILY
Status: DISCONTINUED | OUTPATIENT
Start: 2019-11-13 | End: 2019-11-15

## 2019-11-13 RX ORDER — ONDANSETRON 2 MG/ML
4 INJECTION INTRAMUSCULAR; INTRAVENOUS EVERY 6 HOURS PRN
Status: DISCONTINUED | OUTPATIENT
Start: 2019-11-13 | End: 2019-11-15

## 2019-11-13 RX ORDER — TRISODIUM CITRATE DIHYDRATE AND CITRIC ACID MONOHYDRATE 500; 334 MG/5ML; MG/5ML
30 SOLUTION ORAL AS NEEDED
Status: DISCONTINUED | OUTPATIENT
Start: 2019-11-13 | End: 2019-11-13 | Stop reason: HOSPADM

## 2019-11-13 RX ORDER — DEXTROSE, SODIUM CHLORIDE, SODIUM LACTATE, POTASSIUM CHLORIDE, AND CALCIUM CHLORIDE 5; .6; .31; .03; .02 G/100ML; G/100ML; G/100ML; G/100ML; G/100ML
INJECTION, SOLUTION INTRAVENOUS CONTINUOUS
Status: DISCONTINUED | OUTPATIENT
Start: 2019-11-13 | End: 2019-11-13 | Stop reason: HOSPADM

## 2019-11-13 RX ORDER — TERBUTALINE SULFATE 1 MG/ML
0.25 INJECTION, SOLUTION SUBCUTANEOUS AS NEEDED
Status: DISCONTINUED | OUTPATIENT
Start: 2019-11-13 | End: 2019-11-13 | Stop reason: HOSPADM

## 2019-11-13 RX ORDER — IBUPROFEN 400 MG/1
200 TABLET ORAL EVERY 4 HOURS PRN
Status: DISCONTINUED | OUTPATIENT
Start: 2019-11-13 | End: 2019-11-15

## 2019-11-13 RX ORDER — BISACODYL 10 MG
10 SUPPOSITORY, RECTAL RECTAL ONCE AS NEEDED
Status: DISCONTINUED | OUTPATIENT
Start: 2019-11-13 | End: 2019-11-15

## 2019-11-13 NOTE — L&D DELIVERY NOTE
[facility]    Vaginal Delivery Note    Bhavinangela Jamesonks Patient Status:  Inpatient    3/8/1990 MRN [id number]   Location [address] [facility] Attending MEGAN Anne   Hosp Day # 0 [facility] [address]

## 2019-11-13 NOTE — PROGRESS NOTES
Pt is unsure on if she wants medication for pain, she states the cnxs are much more intense in the last hour. Cnx palpate strong, cervical exam done. Pt decided on shower and declined medication at this time.  [address] [facility]

## 2019-11-13 NOTE — PROGRESS NOTES
Pt is a 34year old female admitted to TR2/TR2-A. Patient presents with:  R/o Labor: contractions all day but increased in intensity at 2100     Pt is [id number] 39w3d intra-uterine pregnancy. History obtained, consents signed.  Oriented to room, staff, and

## 2019-11-13 NOTE — PROGRESS NOTES
Patient up to bathroom with assist x 2. Unable to void at this time. Patient transferred to mother/baby room 365 per wheelchair in stable condition with baby and personal belongings. Accompanied by significant other and staff.   Report given to mother/bab

## 2019-11-13 NOTE — PLAN OF CARE
Problem: Patient Centered Care  Goal: Patient preferences are identified and integrated in the patient's plan of care  Description  Interventions:  - Provide timely, complete, and accurate information to patient/family  - Incorporate patient and family k Term Goal: Uncomplicated Vaginal Delivery     Interventions:  - Assessment  - Induction/Augmentation per protocol and MD order  - Education  - Intervention per protocol with education   - Involve patient in POC  - See additional Care Plan goals for specifi

## 2019-11-13 NOTE — H&P
[facility] - [facility]    History & Physical    Mariseladamari Donohue Patient Status:  Inpatient    3/8/1990 MRN [id number]   Location [address] [place] [address] Attending [address] Day # 0 Admitting Radha Jamesno MD     Da ferrous sulfate 325 (65 FE) MG Oral Tab EC, Take 325 mg by mouth daily with breakfast. PT REPORTS HAVING IRON INFUSION 11/6/19, Disp: , Rfl: , 11/12/2019 at Unknown time  Prenatal Vit-Fe Fumarate-FA ( PRENATAL VITAMINS) 28-0.8 MG Oral Tab, Take by mouth. HGB 10.2 g/dL 12.0 - 16.0 05/23/19 1701      10.9 g/dL 12.0 - 16.0 04/11/19 1702    MCV 70.8 fL 80.0 - 100.0 05/23/19 1701      69.9 fL 80.0 - 100.0 04/11/19 1702    Platelets 925.9 25(8).0 - 450.0 05/23/19 1701      300.0 10(3)uL 150.0 - 450.0 04/1 Glucose 1 Hr        Glucose 2 Hr        Glucose 3 Hr        TSH         Profile        Urine Culture        GC DNA        Chlamydia DNA              35-37 Weeks     Test Value Reference Range Date Time    HCT 32.4 % 35.0 - 48.0 19 2564 Group B streptococcal carriage complicating pregnancy                  Risks, benefits, alternatives and possible complications have been discussed in detail with the patient.    Pre-admission, admission, and post admission procedures and expectations wer

## 2019-11-13 NOTE — PLAN OF CARE
Problem: Patient Centered Care  Goal: Patient preferences are identified and integrated in the patient's plan of care  Description  Interventions:  - What would you like us to know as we care for you?  Pt would like to know all of her options for pain med cultural and social influences on pain and pain management  - Manage/alleviate anxiety  - Utilize distraction and/or relaxation techniques  - Monitor for opioid side effects  - Notify MD/LIP if interventions unsuccessful or patient reports new pain  - Anti

## 2019-11-14 RX ORDER — MELATONIN
325 2 TIMES DAILY WITH MEALS
Status: DISCONTINUED | OUTPATIENT
Start: 2019-11-14 | End: 2019-11-15

## 2019-11-14 NOTE — PLAN OF CARE
Problem: PAIN - ADULT  Goal: Verbalizes/displays adequate comfort level or patient's stated pain goal  Description  INTERVENTIONS:  - Encourage pt to monitor pain and request assistance  - Assess pain using appropriate pain scale  - Administer analgesics with pericare as needed. - Provide VTE prophylaxis as needed. - Monitor bowel function.  - Encourage ambulation and provide assistance as needed. - Assess and monitor emotional status and provide social service/psych resources as needed.   - Utilize theresa information on community breast feeding support. Outcome: Progressing  Goal: Establishment of adequate milk supply with medication/procedure interruptions  Description  INTERVENTIONS:  - Review techniques for milk expression (breast pumping).    - Provide

## 2019-11-14 NOTE — LACTATION NOTE
This note was copied from a baby's chart.   LACTATION NOTE - INFANT    Evaluation Type  Evaluation Type: Inpatient    Problems & Assessment  Infant Assessment: Minimal hunger cues present  Muscle tone: Appropriate for GA    Feeding Assessment  Summary All Nolan

## 2019-11-14 NOTE — PROGRESS NOTES
[facility]    OB/GYNE Progress Note      Akila Latoribio Patient Status:  Inpatient    3/8/1990 MRN [id number]   Location [facility] 3SE Attending MEGAN Robison   Hosp Day # 1 PCP Luiza Moses        Assessment/Plan intact            Results:     Lab Results   Component Value Date    TREPONEMALAB Negative 08/30/2019    ABO A 11/13/2019    RH Positive 11/13/2019    WBC 13.3 (H) 11/14/2019    HGB 9.1 (L) 11/14/2019    HCT 28.1 (L) 11/14/2019    .0 11/14/2019    C

## 2019-11-15 VITALS
RESPIRATION RATE: 16 BRPM | SYSTOLIC BLOOD PRESSURE: 111 MMHG | DIASTOLIC BLOOD PRESSURE: 64 MMHG | HEART RATE: 77 BPM | TEMPERATURE: 98 F

## 2019-11-15 NOTE — PLAN OF CARE
Problem: POSTPARTUM  Goal: Long Term Goal:Experiences normal postpartum course  Description  INTERVENTIONS:  - Assess and monitor vital signs and lab values. - Assess fundus and lochia. - Provide ice/sitz baths for perineum discomfort.   - Monitor heali for signs of nipple pain/trauma. - Instruct and provide assistance with proper latch. - Review techniques for milk expression (breast pumping) and storage of breast milk. Provide pumping equipment/supplies, instructions and assistance, as needed.   Deleta Route

## 2019-11-15 NOTE — DISCHARGE SUMMARY
[facility]    Discharge Summary    Cherelle Austin Patient Status:  Inpatient    3/8/1990 MRN [id number]   Location [facility] 3SE Attending MEGAN Augustine   Hosp Day # 2       Delivering OB Clinician: Amanda Tobias

## 2019-11-15 NOTE — PROGRESS NOTES
[facility]    OB/GYNE Progress Note      Merry Charlette Patient Status:  Inpatient    3/8/1990 MRN    Location [facility] 3SE Attending [address] Day #  PCP            HPI:   Merry Ovalles is a 34 year o anxiety      EXAM:     /55 (BP Location: Right arm)   Pulse 81   Temp 97.9 °F (36.6 °C) (Oral)   Resp 16   LMP 02/10/2019 (Exact Date)   Breastfeeding Yes   There is no height or weight on file to calculate BMI.    GENERAL: well developed, well nouris

## 2019-11-15 NOTE — LACTATION NOTE
LACTATION NOTE - MOTHER      Evaluation Type: Inpatient    Problems identified  Problems identified: Knowledge deficit; Nipple pain    Maternal history  Maternal history: Anemia  Other/comment: GBS+, sickle cell trait    Breastfeeding goal  Breastfeeding go

## 2019-11-30 ENCOUNTER — APPOINTMENT (OUTPATIENT)
Dept: LAB | Facility: HOSPITAL | Age: 29
End: 2019-11-30
Attending: ADVANCED PRACTICE MIDWIFE
Payer: COMMERCIAL

## 2019-11-30 DIAGNOSIS — R39.9 UTI SYMPTOMS: ICD-10-CM

## 2019-11-30 PROCEDURE — 87086 URINE CULTURE/COLONY COUNT: CPT

## 2019-11-30 PROCEDURE — 87186 SC STD MICRODIL/AGAR DIL: CPT

## 2019-11-30 PROCEDURE — 87088 URINE BACTERIA CULTURE: CPT

## 2019-11-30 PROCEDURE — 81001 URINALYSIS AUTO W/SCOPE: CPT

## 2019-11-30 RX ORDER — AMOXICILLIN 250 MG/1
250 CAPSULE ORAL 3 TIMES DAILY
Qty: 21 CAPSULE | Refills: 0 | Status: SHIPPED | OUTPATIENT
Start: 2019-11-30 | End: 2019-12-07

## 2019-12-02 ENCOUNTER — TELEPHONE (OUTPATIENT)
Dept: OBGYN CLINIC | Facility: CLINIC | Age: 29
End: 2019-12-02

## 2019-12-02 NOTE — TELEPHONE ENCOUNTER
----- Message from Crystal Le CNM sent at 12/2/2019  3:41 PM CST -----  Please call her to see if her symptoms have improved. If not we will switch her antibiotic.

## 2019-12-02 NOTE — TELEPHONE ENCOUNTER
Pt states her symptoms are resolving. Discussed pushing po fluids & use of cranberry w/ e-coli.  Pt verbalized an understanding & agrees w/ plan

## 2020-01-27 ENCOUNTER — TELEPHONE (OUTPATIENT)
Dept: HEMATOLOGY/ONCOLOGY | Facility: HOSPITAL | Age: 30
End: 2020-01-27

## 2020-01-27 NOTE — TELEPHONE ENCOUNTER
Left message for pt to go and get labs done so we can see where her iron is at then decide if she needs another infusion. Asked for a call back with any questions.

## 2020-01-27 NOTE — TELEPHONE ENCOUNTER
Beena called requesting an iron infusion. She delivered in NOvember and is having extreme fatigue like previously experienced when she needed iron tx. No recent labs, no other complaints. Last injectafer 11/12/19.

## 2020-01-29 ENCOUNTER — OFFICE VISIT (OUTPATIENT)
Dept: OBGYN CLINIC | Facility: CLINIC | Age: 30
End: 2020-01-29
Payer: COMMERCIAL

## 2020-01-29 VITALS
HEART RATE: 87 BPM | WEIGHT: 137.38 LBS | BODY MASS INDEX: 23 KG/M2 | SYSTOLIC BLOOD PRESSURE: 109 MMHG | DIASTOLIC BLOOD PRESSURE: 72 MMHG

## 2020-01-29 DIAGNOSIS — N89.8 VAGINAL DISCHARGE: Primary | ICD-10-CM

## 2020-01-29 PROCEDURE — 99213 OFFICE O/P EST LOW 20 MIN: CPT | Performed by: ADVANCED PRACTICE MIDWIFE

## 2020-01-31 LAB
GENITAL VAGINOSIS SCREEN: NEGATIVE
TRICHOMONAS SCREEN: NEGATIVE

## 2020-02-06 NOTE — PROGRESS NOTES
HPI:   Gisela Diaz is a 34year old female who presents for a gyne physical last PP visit.  Happy, breastfeeding  Exercise: some    Wt Readings from Last 3 Encounters:  01/29/20 : 137 lb 6.4 oz (62.3 kg)  11/12/19 : 158 lb (71.7 kg)  11/08/19 : 158 lb Pulse 87   Wt 137 lb 6.4 oz (62.3 kg)   LMP 02/10/2019 (Exact Date)   Breastfeeding Yes   BMI 22.69 kg/m²   GENERAL: well developed, well nourished,in no apparent distress  SKIN: no rashes,no suspicious lesions  HEENT: atraumatic, normocephalic  NECK: supp

## 2020-03-05 ENCOUNTER — TELEPHONE (OUTPATIENT)
Dept: LACTATION | Facility: HOSPITAL | Age: 30
End: 2020-03-05

## 2020-03-05 NOTE — TELEPHONE ENCOUNTER
Mom concerned that her menses returned today, exclusively breastfeeding, Was told she would not get her menses if still nursing 8-10x/day, should she pump and discard the milk?   Left message that she can continue breastfeeding and that not every mom remain

## 2021-01-25 ENCOUNTER — OFFICE VISIT (OUTPATIENT)
Dept: OBGYN CLINIC | Facility: CLINIC | Age: 31
End: 2021-01-25
Payer: COMMERCIAL

## 2021-01-25 VITALS
BODY MASS INDEX: 21.08 KG/M2 | HEIGHT: 64.5 IN | DIASTOLIC BLOOD PRESSURE: 77 MMHG | WEIGHT: 125 LBS | SYSTOLIC BLOOD PRESSURE: 132 MMHG | HEART RATE: 85 BPM

## 2021-01-25 DIAGNOSIS — N92.6 MISSED MENSES: Primary | ICD-10-CM

## 2021-01-25 DIAGNOSIS — N89.8 VAGINAL DISCHARGE: ICD-10-CM

## 2021-01-25 LAB
CONTROL LINE PRESENT WITH A CLEAR BACKGROUND (YES/NO): YES YES/NO
KIT LOT #: NORMAL NUMERIC
PREGNANCY TEST, URINE: POSITIVE

## 2021-01-25 PROCEDURE — 3008F BODY MASS INDEX DOCD: CPT | Performed by: ADVANCED PRACTICE MIDWIFE

## 2021-01-25 PROCEDURE — 3078F DIAST BP <80 MM HG: CPT | Performed by: ADVANCED PRACTICE MIDWIFE

## 2021-01-25 PROCEDURE — 81025 URINE PREGNANCY TEST: CPT | Performed by: ADVANCED PRACTICE MIDWIFE

## 2021-01-25 PROCEDURE — 3075F SYST BP GE 130 - 139MM HG: CPT | Performed by: ADVANCED PRACTICE MIDWIFE

## 2021-01-25 PROCEDURE — 99213 OFFICE O/P EST LOW 20 MIN: CPT | Performed by: ADVANCED PRACTICE MIDWIFE

## 2021-01-25 NOTE — PROGRESS NOTES
HPI:   Oc Garcia is a 27year old female who presents for a missed menses visit. Initially not concernd eabout pregnancy - Positive pregnancy test on urine. Had noticed thicker vaginal discharge. Same partner. Nursing 16 month old.  Had light period Comment: occasional    Drug use: Never       REVIEW OF SYSTEMS:   GENERAL: tired, declines fever, chills and bodyaches.    BREAST: denies  GI: denies abdominal pain, no nausea no vomiting  : denies urinary complaints (frequency, dysuria, urgency), denies

## 2021-01-27 LAB
GENITAL VAGINOSIS SCREEN: NEGATIVE
TRICHOMONAS SCREEN: NEGATIVE

## 2021-02-05 ENCOUNTER — HOSPITAL ENCOUNTER (OUTPATIENT)
Dept: ULTRASOUND IMAGING | Age: 31
Discharge: HOME | End: 2021-02-05
Attending: ADVANCED PRACTICE MIDWIFE
Payer: COMMERCIAL

## 2021-02-05 DIAGNOSIS — N92.6 MISSED MENSES: ICD-10-CM

## 2021-02-05 PROCEDURE — 76801 OB US < 14 WKS SINGLE FETUS: CPT | Performed by: ADVANCED PRACTICE MIDWIFE

## 2021-02-20 ENCOUNTER — MOBILE ENCOUNTER (OUTPATIENT)
Dept: OBGYN CLINIC | Facility: CLINIC | Age: 31
End: 2021-02-20

## 2021-02-20 ENCOUNTER — ROUTINE PRENATAL (OUTPATIENT)
Dept: OBGYN CLINIC | Facility: CLINIC | Age: 31
End: 2021-02-20
Payer: COMMERCIAL

## 2021-02-20 ENCOUNTER — HOSPITAL ENCOUNTER (OUTPATIENT)
Dept: ULTRASOUND IMAGING | Facility: HOSPITAL | Age: 31
Discharge: HOME | End: 2021-02-20
Attending: ADVANCED PRACTICE MIDWIFE
Payer: COMMERCIAL

## 2021-02-20 ENCOUNTER — LAB ENCOUNTER (OUTPATIENT)
Dept: LAB | Facility: HOSPITAL | Age: 31
End: 2021-02-20
Attending: ADVANCED PRACTICE MIDWIFE
Payer: COMMERCIAL

## 2021-02-20 VITALS — HEART RATE: 65 BPM | DIASTOLIC BLOOD PRESSURE: 64 MMHG | SYSTOLIC BLOOD PRESSURE: 99 MMHG

## 2021-02-20 DIAGNOSIS — O20.9 BLEEDING IN EARLY PREGNANCY: ICD-10-CM

## 2021-02-20 DIAGNOSIS — O20.9 BLEEDING IN EARLY PREGNANCY: Primary | ICD-10-CM

## 2021-02-20 DIAGNOSIS — O03.4 INCOMPLETE SPONTANEOUS ABORTION: ICD-10-CM

## 2021-02-20 PROBLEM — D57.3 SICKLE CELL TRAIT (HCC): Status: RESOLVED | Noted: 2019-04-23 | Resolved: 2021-02-20

## 2021-02-20 PROBLEM — D57.3 SICKLE CELL TRAIT: Status: RESOLVED | Noted: 2019-04-23 | Resolved: 2021-02-20

## 2021-02-20 PROBLEM — Z34.90 PREGNANCY (HCC): Status: RESOLVED | Noted: 2019-05-24 | Resolved: 2021-02-20

## 2021-02-20 PROBLEM — Z34.90 PREGNANCY: Status: RESOLVED | Noted: 2019-05-24 | Resolved: 2021-02-20

## 2021-02-20 LAB
B-HCG SERPL-ACNC: 2284 MIU/ML
PROGEST SERPL-MCNC: 1.69 NG/ML

## 2021-02-20 PROCEDURE — 84144 ASSAY OF PROGESTERONE: CPT | Performed by: ADVANCED PRACTICE MIDWIFE

## 2021-02-20 PROCEDURE — 76817 TRANSVAGINAL US OBSTETRIC: CPT | Performed by: ADVANCED PRACTICE MIDWIFE

## 2021-02-20 PROCEDURE — 36415 COLL VENOUS BLD VENIPUNCTURE: CPT | Performed by: ADVANCED PRACTICE MIDWIFE

## 2021-02-20 PROCEDURE — 84702 CHORIONIC GONADOTROPIN TEST: CPT | Performed by: ADVANCED PRACTICE MIDWIFE

## 2021-02-20 PROCEDURE — 3074F SYST BP LT 130 MM HG: CPT | Performed by: ADVANCED PRACTICE MIDWIFE

## 2021-02-20 PROCEDURE — 99213 OFFICE O/P EST LOW 20 MIN: CPT | Performed by: ADVANCED PRACTICE MIDWIFE

## 2021-02-20 PROCEDURE — 3078F DIAST BP <80 MM HG: CPT | Performed by: ADVANCED PRACTICE MIDWIFE

## 2021-02-20 PROCEDURE — 76801 OB US < 14 WKS SINGLE FETUS: CPT | Performed by: ADVANCED PRACTICE MIDWIFE

## 2021-02-20 NOTE — PROGRESS NOTES
Pt paged the midwife to report that she has been having cramps in her back that started 30 min ago, now brown and pinkish bleeding. 8wga with confirmed IUP. Pt was instructed to report to the office the next morning for ultrasound and labs.   She voiced un

## 2021-02-20 NOTE — PROGRESS NOTES
Patient presents with:  Gyn Exam: bleeding in early pregnancy       HPI:   Beena is 27year old , here today as a walk-in with vaginal bleeding in early pregnancy.  She paged the midwife last night to report that she noted dark brown blood in her pa Judgment: Judgment normal.        Today's ultrasound findings:   No FHTs today  [facility] today [id number]     ASSESSMENT/PLAN:     Autumn was seen today for gyn exam.    Diagnoses and all orders for this visit:    Bleeding in early pregnancy  -     HCG, BETA SUBUNIT

## 2021-02-20 NOTE — PATIENT INSTRUCTIONS
Incomplete Miscarriage   Today's exams show your pregnancy has ended suddenly. This can be emotionally very difficult. There is little that can be done to change the way you feel. But understand that miscarriages are common.   About 1 or 2 out of every 10 may pass fetal tissue. If you see anything, it may appear as a 1-inch or larger piece of gray or pink flesh.  If fetal tissue has not passed from your vagina within the next 5 days, you need to see your healthcare provider for another exam. To prevent infec

## 2021-02-22 ENCOUNTER — TELEPHONE (OUTPATIENT)
Dept: OBGYN CLINIC | Facility: CLINIC | Age: 31
End: 2021-02-22

## 2021-02-22 NOTE — TELEPHONE ENCOUNTER
Home calling about cremation of miscarriage. The needs a signed death certificate. They need a doctors name and phone#. Other questions    Please advise.

## 2021-02-22 NOTE — TELEPHONE ENCOUNTER
Pt returned call and reported that she felt cramping on Saturday then sneezed and at that time passed the products of conception. When she saw them she was unable to dispose of them herself so called a  home for cremation.  Since then she has been fe

## 2021-02-22 NOTE — TELEPHONE ENCOUNTER
Received message from answering service notifying pt is returning TM's call. TM paged and notified. TM will call pt now.

## 2021-02-22 NOTE — TELEPHONE ENCOUNTER
Patient requesting to speak with midwife regarding miscarriage she had over the weekend. Please call at:[phone],thanks.

## 2021-02-22 NOTE — TELEPHONE ENCOUNTER
Emil Duke from [facility] asking to speak with a nurse. Patient had still born at home and wishes for cremation but they need a death certificate. Edit: Spoke to [employer] and she has already informed patient.  home was informed.

## 2021-02-22 NOTE — TELEPHONE ENCOUNTER
Pt advised per MBW, CNMs do not do death certificates for miscarriages. Pt agreed and voiced understanding.

## 2021-02-22 NOTE — TELEPHONE ENCOUNTER
Received call from [address] [place] [address],[address], chief  asking to speak with TM regarding death certificate for miscarriage. TM paged and given Criselda's [phone].

## 2021-02-22 NOTE — TELEPHONE ENCOUNTER
Rec'd message to call patient regarding desire for death certificate for  home. Called patient, no answer.  Left VM

## 2021-02-23 ENCOUNTER — OFFICE VISIT (OUTPATIENT)
Dept: OBGYN CLINIC | Facility: CLINIC | Age: 31
End: 2021-02-23
Payer: COMMERCIAL

## 2021-02-23 VITALS — WEIGHT: 125 LBS | BODY MASS INDEX: 21 KG/M2 | SYSTOLIC BLOOD PRESSURE: 114 MMHG | DIASTOLIC BLOOD PRESSURE: 70 MMHG

## 2021-02-23 DIAGNOSIS — Z30.09 GENERAL COUNSELING AND ADVICE ON FEMALE CONTRACEPTION: ICD-10-CM

## 2021-02-23 DIAGNOSIS — O03.9 MISCARRIAGE: Primary | ICD-10-CM

## 2021-02-23 PROBLEM — O99.820 GROUP B STREPTOCOCCAL CARRIAGE COMPLICATING PREGNANCY: Status: RESOLVED | Noted: 2019-11-05 | Resolved: 2021-02-23

## 2021-02-23 PROBLEM — O99.820 GROUP B STREPTOCOCCAL CARRIAGE COMPLICATING PREGNANCY (HCC): Status: RESOLVED | Noted: 2019-11-05 | Resolved: 2021-02-23

## 2021-02-23 PROCEDURE — 3074F SYST BP LT 130 MM HG: CPT | Performed by: OBSTETRICS & GYNECOLOGY

## 2021-02-23 PROCEDURE — 99214 OFFICE O/P EST MOD 30 MIN: CPT | Performed by: OBSTETRICS & GYNECOLOGY

## 2021-02-23 PROCEDURE — 3078F DIAST BP <80 MM HG: CPT | Performed by: OBSTETRICS & GYNECOLOGY

## 2021-02-23 RX ORDER — ACETAMINOPHEN AND CODEINE PHOSPHATE 120; 12 MG/5ML; MG/5ML
0.35 SOLUTION ORAL DAILY
Qty: 3 PACKAGE | Refills: 1 | Status: SHIPPED | OUTPATIENT
Start: 2021-02-23 | End: 2021-07-23

## 2021-02-24 NOTE — PROGRESS NOTES
[facility]  Obstetrics and Gynecology  Focused Gynecology Problem Exam  MD Cha Marie José Miguel is a 27year old female presenting for Gyn Exam (Midwife patient recent missed ab ga 8wk needs MD to sign off on death certificate)  .     H B beta strep +   4 SAB 12/01/18 6w0d          3 SAB 01/01/10 7w0d          2 TAB 01/01/09           1 Term 05/25/07 40w1d  8 lb (3.629 kg) M NORMAL SPONT None N EZEQUIEL                                        Past Medical History:   Diagnosis Date   • Anemia apparent distress     ASSESSMENT:    A: 27 y.o. [id number] status post presumed complete AB based on patient's history.   Patient presented today because she would like a death certificate in order to have her fetal remains cremated.    (O03.9) Miscarriage  (p

## 2021-05-14 ENCOUNTER — APPOINTMENT (OUTPATIENT)
Dept: OBGYN | Age: 31
End: 2021-05-14

## 2021-05-28 ENCOUNTER — APPOINTMENT (OUTPATIENT)
Dept: OBGYN | Age: 31
End: 2021-05-28

## 2021-06-07 ENCOUNTER — TELEPHONE (OUTPATIENT)
Dept: OBGYN | Age: 31
End: 2021-06-07

## 2021-06-07 ENCOUNTER — TELEPHONE (OUTPATIENT)
Dept: OBGYN CLINIC | Facility: CLINIC | Age: 31
End: 2021-06-07

## 2021-06-07 NOTE — TELEPHONE ENCOUNTER
Pt confirms +HPT and LMP 3/20. Pt is 11w2d. Taking PNV and Iron. Nursing her 21 month old. Feeling well with a little nausea. Pt delivered with FRANCIS in 2019. Wishes to switch to our office.  Pt aware she will see all 6 doctors and the first appt is with damari carrasco

## 2021-06-07 NOTE — TELEPHONE ENCOUNTER
lmp 3/20  Pt has not seen anyone yet for this pregnancy. Did not like treatment of miscarriage with midwives. Please advise.

## 2021-06-09 ENCOUNTER — APPOINTMENT (OUTPATIENT)
Dept: OBGYN | Age: 31
End: 2021-06-09

## 2021-06-10 ENCOUNTER — NURSE ONLY (OUTPATIENT)
Dept: OBGYN CLINIC | Facility: CLINIC | Age: 31
End: 2021-06-10
Payer: COMMERCIAL

## 2021-06-10 ENCOUNTER — TELEPHONE (OUTPATIENT)
Dept: OBGYN CLINIC | Facility: CLINIC | Age: 31
End: 2021-06-10

## 2021-06-10 DIAGNOSIS — Z34.81 ENCOUNTER FOR SUPERVISION OF OTHER NORMAL PREGNANCY IN FIRST TRIMESTER: Primary | ICD-10-CM

## 2021-06-10 NOTE — TELEPHONE ENCOUNTER
Pt had her OBN PC Appt today. Pt is breastfeeding her child at home 3 times a week. How long can she breastfeed and when should she stop? Pt states she read to take a supplement of iron and a pill magnesium/calcium/vitamin D while nursing.   Pt is t

## 2021-06-10 NOTE — PROGRESS NOTES
Pt seen for OBN  PC appt today with no complaints. Normal PN labs ordered, sickle cell, hcg. Pt advised all labs must be completed and resulted prior to MD appt. Ht 5'4.5\" and wt 130lbs. Before preg 120lbs.     Pt states she wants her labs done at [facility]?     Microcytic Anemia     Beta Thal. Yes    No     Yes    Yes            INFECTION HISTORY    Chlamydia No    Pt or partner have hx of Genital Herpes No    Gonorrhea No    Hepatitis B No    HIV No    HPV No    MRSA No    Syphilis No    Ta Calcium - 222mg daily                                                VIT D -     10 mcg               MISC    Infant vaccinations  Yes     Pt. Answered YES to any 5P questions.   Pt's dad had a problem with alcohol a

## 2021-06-11 NOTE — TELEPHONE ENCOUNTER
Informed pt that CAP stated she should stop breastfeeding by the end of the first trimester. Informed pt that she should discuss with SIRENA at her New OB MD appt about her taking extra supplement of iron and a pill with magnesium/calcium/Vitamin D.  Inform

## 2021-06-11 NOTE — TELEPHONE ENCOUNTER
We can discuss these issues at her prenatal appt with the MD.  Unless she is anemic or known to be sufficient in some vitamin then the pnv alone is usually sufficient.   She should stop breastfeeding by the end of the first trimester

## 2021-06-12 ENCOUNTER — LAB ENCOUNTER (OUTPATIENT)
Dept: LAB | Facility: HOSPITAL | Age: 31
End: 2021-06-12
Attending: OBSTETRICS & GYNECOLOGY
Payer: COMMERCIAL

## 2021-06-12 ENCOUNTER — TELEPHONE (OUTPATIENT)
Dept: OBGYN CLINIC | Facility: CLINIC | Age: 31
End: 2021-06-12

## 2021-06-12 DIAGNOSIS — Z34.81 ENCOUNTER FOR SUPERVISION OF OTHER NORMAL PREGNANCY IN FIRST TRIMESTER: ICD-10-CM

## 2021-06-12 PROCEDURE — 86762 RUBELLA ANTIBODY: CPT

## 2021-06-12 PROCEDURE — 85660 RBC SICKLE CELL TEST: CPT

## 2021-06-12 PROCEDURE — 87340 HEPATITIS B SURFACE AG IA: CPT

## 2021-06-12 PROCEDURE — 87086 URINE CULTURE/COLONY COUNT: CPT

## 2021-06-12 PROCEDURE — 86850 RBC ANTIBODY SCREEN: CPT

## 2021-06-12 PROCEDURE — 84703 CHORIONIC GONADOTROPIN ASSAY: CPT

## 2021-06-12 PROCEDURE — 86901 BLOOD TYPING SEROLOGIC RH(D): CPT

## 2021-06-12 PROCEDURE — 87389 HIV-1 AG W/HIV-1&-2 AB AG IA: CPT

## 2021-06-12 PROCEDURE — 86900 BLOOD TYPING SEROLOGIC ABO: CPT

## 2021-06-12 PROCEDURE — 36415 COLL VENOUS BLD VENIPUNCTURE: CPT

## 2021-06-12 PROCEDURE — 86780 TREPONEMA PALLIDUM: CPT

## 2021-06-12 PROCEDURE — 85025 COMPLETE CBC W/AUTO DIFF WBC: CPT

## 2021-06-12 NOTE — TELEPHONE ENCOUNTER
Pt is 12w0d, states she is going in for 1st trimester labs today and asking if she needs to do NIPT testing? States if she needs to do NIPT then asking if she can do it today with 1st trimester labs?   Advised pt NIPT screening is optional. Informed pt the

## 2021-06-12 NOTE — TELEPHONE ENCOUNTER
Patient has a few questions regarding the lab work that is needed and what may be coming up. Hoping to get all done at the same time. Please call.

## 2021-06-25 ENCOUNTER — INITIAL PRENATAL (OUTPATIENT)
Dept: OBGYN CLINIC | Facility: CLINIC | Age: 31
End: 2021-06-25
Payer: COMMERCIAL

## 2021-06-25 VITALS
SYSTOLIC BLOOD PRESSURE: 98 MMHG | WEIGHT: 128.19 LBS | BODY MASS INDEX: 22 KG/M2 | DIASTOLIC BLOOD PRESSURE: 61 MMHG | HEART RATE: 81 BPM

## 2021-06-25 DIAGNOSIS — Z34.91 ENCOUNTER FOR SUPERVISION OF NORMAL PREGNANCY IN FIRST TRIMESTER, UNSPECIFIED GRAVIDITY: Primary | ICD-10-CM

## 2021-06-25 DIAGNOSIS — Z11.3 SCREENING EXAMINATION FOR VENEREAL DISEASE: ICD-10-CM

## 2021-06-25 PROCEDURE — 3078F DIAST BP <80 MM HG: CPT | Performed by: OBSTETRICS & GYNECOLOGY

## 2021-06-25 PROCEDURE — 3074F SYST BP LT 130 MM HG: CPT | Performed by: OBSTETRICS & GYNECOLOGY

## 2021-06-25 PROCEDURE — 81002 URINALYSIS NONAUTO W/O SCOPE: CPT | Performed by: OBSTETRICS & GYNECOLOGY

## 2021-06-26 ENCOUNTER — TELEPHONE (OUTPATIENT)
Dept: OBGYN CLINIC | Facility: CLINIC | Age: 31
End: 2021-06-26

## 2021-06-26 DIAGNOSIS — O99.012 ANEMIA DURING PREGNANCY IN SECOND TRIMESTER: Primary | ICD-10-CM

## 2021-06-26 DIAGNOSIS — D56.3 BETA THALASSEMIA TRAIT: ICD-10-CM

## 2021-06-26 NOTE — PROGRESS NOTES
Feeling well. Declines genetics. 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

## 2021-06-26 NOTE — TELEPHONE ENCOUNTER
See anemia plan. Ferritin, iron and TIBC ordered. Message left for patient to draw and confirm for us through My Chart whether she wanted to still consider Quad screen at next visit. I had written that she declined genetics but I want to make sure.  She is

## 2021-06-30 PROBLEM — Z13.79 GENETIC SCREENING: Status: ACTIVE | Noted: 2021-06-30

## 2021-07-12 ENCOUNTER — TELEPHONE (OUTPATIENT)
Dept: OBGYN CLINIC | Facility: CLINIC | Age: 31
End: 2021-07-12

## 2021-07-12 NOTE — TELEPHONE ENCOUNTER
Roman. Initial PN appt was on 6/25, pt needs to return for PN appt on 7/23/21. Pt booked in the only PN appt available on 7/23 at 8/:40 with MIGUEL.

## 2021-07-12 NOTE — TELEPHONE ENCOUNTER
Patient is wanting a follow up OB on a Friday from July 30th through August 6th. There is nothing available and offered August 3rd with Ghia at 11:30am but patient canceled it. Are we able to get her in on a Friday?     thanks

## 2021-07-19 ENCOUNTER — TELEPHONE (OUTPATIENT)
Dept: HEMATOLOGY/ONCOLOGY | Facility: HOSPITAL | Age: 31
End: 2021-07-19

## 2021-07-19 NOTE — TELEPHONE ENCOUNTER
Patient would like to discuss coming in for an iron infusion,her iron levels are very low.  Last time she was in was 2019 with her prior pregnancy

## 2021-07-23 ENCOUNTER — ROUTINE PRENATAL (OUTPATIENT)
Dept: OBGYN CLINIC | Facility: CLINIC | Age: 31
End: 2021-07-23
Payer: COMMERCIAL

## 2021-07-23 VITALS
WEIGHT: 132.19 LBS | HEART RATE: 68 BPM | SYSTOLIC BLOOD PRESSURE: 99 MMHG | DIASTOLIC BLOOD PRESSURE: 61 MMHG | BODY MASS INDEX: 22 KG/M2

## 2021-07-23 DIAGNOSIS — Z34.91 ENCOUNTER FOR SUPERVISION OF NORMAL PREGNANCY IN FIRST TRIMESTER, UNSPECIFIED GRAVIDITY: Primary | ICD-10-CM

## 2021-07-23 LAB
APPEARANCE: CLEAR
BILIRUBIN: NEGATIVE
GLUCOSE (URINE DIPSTICK): NEGATIVE MG/DL
KETONES (URINE DIPSTICK): NEGATIVE MG/DL
LEUKOCYTES: NEGATIVE
MULTISTIX LOT#: 1027 NUMERIC
NITRITE, URINE: NEGATIVE
OCCULT BLOOD: NEGATIVE
PH, URINE: 7 (ref 4.5–8)
PROTEIN (URINE DIPSTICK): NEGATIVE MG/DL
SPECIFIC GRAVITY: 1.01 (ref 1–1.03)
URINE-COLOR: YELLOW
UROBILINOGEN,SEMI-QN: 0.2 MG/DL (ref 0–1.9)

## 2021-07-23 PROCEDURE — 81002 URINALYSIS NONAUTO W/O SCOPE: CPT | Performed by: OBSTETRICS & GYNECOLOGY

## 2021-07-23 PROCEDURE — 3074F SYST BP LT 130 MM HG: CPT | Performed by: OBSTETRICS & GYNECOLOGY

## 2021-07-23 PROCEDURE — 3078F DIAST BP <80 MM HG: CPT | Performed by: OBSTETRICS & GYNECOLOGY

## 2021-07-23 NOTE — PROGRESS NOTES
Declined quad screen. Order for 20 wk ultrasound. Pt contacted heme-- got order for blood work.   RTC 4 wk

## 2021-07-29 ENCOUNTER — PATIENT MESSAGE (OUTPATIENT)
Dept: OBGYN CLINIC | Facility: CLINIC | Age: 31
End: 2021-07-29

## 2021-07-30 NOTE — TELEPHONE ENCOUNTER
-- Message is from the [facility]--    Reason for Call: Keerthi , calling from [facility], is requesting a call back from Dr. Boss. The patient needs to complete a health risk assessment. She would like to know if there is another contact number for the patient. Please contact Keerthi with this information.      Caller Information       Type Contact Phone    04/15/2019 11:02 AM Phone (Incoming) Keerthi Good [phone]          Alternative phone number: None     Turnaround time given to caller:   \"This message will be sent to [state Provider's name]. The clinical team will fulfill your request as soon as they review your message.\"     From: Beena Garcia  To: Arpit Cazares.  DO Swati  Sent: 7/29/2021 6:16 PM CDT  Subject: Non-Urgent Medical Question    Hi Dr Calos Ludwig,  I would like to get a Covid vaccine soon and would like to know if you have recommendations around when I should do this

## 2021-08-13 ENCOUNTER — HOSPITAL ENCOUNTER (OUTPATIENT)
Dept: ULTRASOUND IMAGING | Facility: HOSPITAL | Age: 31
Discharge: HOME | End: 2021-08-13
Attending: OBSTETRICS & GYNECOLOGY
Payer: COMMERCIAL

## 2021-08-13 DIAGNOSIS — Z34.91 ENCOUNTER FOR SUPERVISION OF NORMAL PREGNANCY IN FIRST TRIMESTER, UNSPECIFIED GRAVIDITY: ICD-10-CM

## 2021-08-13 PROCEDURE — 76805 OB US >/= 14 WKS SNGL FETUS: CPT | Performed by: OBSTETRICS & GYNECOLOGY

## 2021-08-20 ENCOUNTER — ROUTINE PRENATAL (OUTPATIENT)
Dept: OBGYN CLINIC | Facility: CLINIC | Age: 31
End: 2021-08-20
Payer: COMMERCIAL

## 2021-08-20 ENCOUNTER — TELEPHONE (OUTPATIENT)
Dept: OBGYN CLINIC | Facility: CLINIC | Age: 31
End: 2021-08-20

## 2021-08-20 VITALS
HEART RATE: 79 BPM | DIASTOLIC BLOOD PRESSURE: 62 MMHG | BODY MASS INDEX: 22 KG/M2 | WEIGHT: 131.38 LBS | SYSTOLIC BLOOD PRESSURE: 101 MMHG

## 2021-08-20 DIAGNOSIS — Z34.82 ENCOUNTER FOR SUPERVISION OF OTHER NORMAL PREGNANCY IN SECOND TRIMESTER: Primary | ICD-10-CM

## 2021-08-20 LAB
BILIRUBIN: NEGATIVE
GLUCOSE (URINE DIPSTICK): NEGATIVE MG/DL
KETONES (URINE DIPSTICK): NEGATIVE MG/DL
LEUKOCYTES: NEGATIVE
MULTISTIX EXPIRATION DATE: NORMAL DATE
MULTISTIX LOT#: 1027 NUMERIC
NITRITE, URINE: NEGATIVE
OCCULT BLOOD: NEGATIVE
PH, URINE: 7.5 (ref 4.5–8)
PROTEIN (URINE DIPSTICK): NEGATIVE MG/DL
SPECIFIC GRAVITY: 1.01 (ref 1–1.03)
UROBILINOGEN,SEMI-QN: 0.2 MG/DL (ref 0–1.9)

## 2021-08-20 PROCEDURE — 3078F DIAST BP <80 MM HG: CPT | Performed by: OBSTETRICS & GYNECOLOGY

## 2021-08-20 PROCEDURE — 81002 URINALYSIS NONAUTO W/O SCOPE: CPT | Performed by: OBSTETRICS & GYNECOLOGY

## 2021-08-20 PROCEDURE — 3074F SYST BP LT 130 MM HG: CPT | Performed by: OBSTETRICS & GYNECOLOGY

## 2021-08-20 NOTE — TELEPHONE ENCOUNTER
Called pt to offer her appts next week either Tuesday or Wednesday. Pt asking if she can just come to her appt today. Pt informed time is still available. Pt states she would like to come for her appt today.  Pt rebooked for appt at 21 [phone] with SIRENA.

## 2021-09-10 ENCOUNTER — PATIENT MESSAGE (OUTPATIENT)
Dept: OBGYN CLINIC | Facility: CLINIC | Age: 31
End: 2021-09-10

## 2021-09-11 NOTE — TELEPHONE ENCOUNTER
From: Beena Garcia  To: Cristino Gupta. [facility], DO  Sent: 9/10/2021 5:34 PM CDT  Subject: Non-Urgent Medical Question    Hello,  I noticed that my appts with Dr [facility] were canceled.   Cindy Chau

## 2021-09-29 ENCOUNTER — PATIENT MESSAGE (OUTPATIENT)
Dept: OBGYN CLINIC | Facility: CLINIC | Age: 31
End: 2021-09-29

## 2021-09-29 DIAGNOSIS — Z34.82 ENCOUNTER FOR SUPERVISION OF OTHER NORMAL PREGNANCY IN SECOND TRIMESTER: Primary | ICD-10-CM

## 2021-09-29 NOTE — TELEPHONE ENCOUNTER
From: Beena Garcia  To: Daly Bill. Aracelis Lepe DO  Sent: 9/29/2021 5:39 PM CDT  Subject: Glucose Screening    Hello, my next appointment is not until October 8.  Is it possible to request my glucose screening so that I can do it the same day as my appointme

## 2021-09-29 NOTE — TELEPHONE ENCOUNTER
Patient is 27w4d, requesting 1 hr GTT to be done at 10/8 appt. Orders placed and Patient notified to complete prior to appt if possible.

## 2021-10-08 ENCOUNTER — LAB ENCOUNTER (OUTPATIENT)
Dept: LAB | Facility: HOSPITAL | Age: 31
End: 2021-10-08
Attending: OBSTETRICS & GYNECOLOGY
Payer: COMMERCIAL

## 2021-10-08 ENCOUNTER — ROUTINE PRENATAL (OUTPATIENT)
Dept: OBGYN CLINIC | Facility: CLINIC | Age: 31
End: 2021-10-08
Payer: COMMERCIAL

## 2021-10-08 VITALS
BODY MASS INDEX: 24 KG/M2 | DIASTOLIC BLOOD PRESSURE: 58 MMHG | HEART RATE: 80 BPM | SYSTOLIC BLOOD PRESSURE: 99 MMHG | WEIGHT: 143.19 LBS

## 2021-10-08 DIAGNOSIS — D56.1 BETA-THALASSEMIA (HCC): ICD-10-CM

## 2021-10-08 DIAGNOSIS — D50.9 MICROCYTIC ANEMIA: ICD-10-CM

## 2021-10-08 DIAGNOSIS — D56.3 BETA THALASSEMIA TRAIT: ICD-10-CM

## 2021-10-08 DIAGNOSIS — Z34.83 ENCOUNTER FOR SUPERVISION OF OTHER NORMAL PREGNANCY IN THIRD TRIMESTER: Primary | ICD-10-CM

## 2021-10-08 DIAGNOSIS — O99.012 ANEMIA DURING PREGNANCY IN SECOND TRIMESTER: ICD-10-CM

## 2021-10-08 DIAGNOSIS — Z34.82 ENCOUNTER FOR SUPERVISION OF OTHER NORMAL PREGNANCY IN SECOND TRIMESTER: ICD-10-CM

## 2021-10-08 PROCEDURE — 3074F SYST BP LT 130 MM HG: CPT | Performed by: OBSTETRICS & GYNECOLOGY

## 2021-10-08 PROCEDURE — 36415 COLL VENOUS BLD VENIPUNCTURE: CPT

## 2021-10-08 PROCEDURE — 3078F DIAST BP <80 MM HG: CPT | Performed by: OBSTETRICS & GYNECOLOGY

## 2021-10-08 PROCEDURE — 82746 ASSAY OF FOLIC ACID SERUM: CPT

## 2021-10-08 PROCEDURE — 82728 ASSAY OF FERRITIN: CPT

## 2021-10-08 PROCEDURE — 85025 COMPLETE CBC W/AUTO DIFF WBC: CPT

## 2021-10-08 PROCEDURE — 83540 ASSAY OF IRON: CPT

## 2021-10-08 PROCEDURE — 82607 VITAMIN B-12: CPT

## 2021-10-08 PROCEDURE — 84466 ASSAY OF TRANSFERRIN: CPT

## 2021-10-08 PROCEDURE — 81002 URINALYSIS NONAUTO W/O SCOPE: CPT | Performed by: OBSTETRICS & GYNECOLOGY

## 2021-10-08 NOTE — PROGRESS NOTES
No issues. Missed last appt due to illness and didn't reschedule. Discussed importance of routine PNC. Didn't leave urine by end of appt. Recent covid vax so pt wants to wait on flu and tdap until next appt . Needs to do 2T labs still. RTC 2 wks.

## 2021-10-12 ENCOUNTER — LAB ENCOUNTER (OUTPATIENT)
Dept: LAB | Facility: HOSPITAL | Age: 31
End: 2021-10-12
Attending: OBSTETRICS & GYNECOLOGY
Payer: COMMERCIAL

## 2021-10-12 DIAGNOSIS — Z34.82 ENCOUNTER FOR SUPERVISION OF OTHER NORMAL PREGNANCY IN SECOND TRIMESTER: ICD-10-CM

## 2021-10-12 PROCEDURE — 82950 GLUCOSE TEST: CPT

## 2021-10-12 PROCEDURE — 36415 COLL VENOUS BLD VENIPUNCTURE: CPT

## 2021-11-05 ENCOUNTER — ROUTINE PRENATAL (OUTPATIENT)
Dept: OBGYN CLINIC | Facility: CLINIC | Age: 31
End: 2021-11-05
Payer: COMMERCIAL

## 2021-11-05 VITALS
DIASTOLIC BLOOD PRESSURE: 66 MMHG | WEIGHT: 147 LBS | BODY MASS INDEX: 25 KG/M2 | SYSTOLIC BLOOD PRESSURE: 108 MMHG | HEART RATE: 83 BPM

## 2021-11-05 DIAGNOSIS — Z34.93 ENCOUNTER FOR SUPERVISION OF NORMAL PREGNANCY IN THIRD TRIMESTER, UNSPECIFIED GRAVIDITY: Primary | ICD-10-CM

## 2021-11-05 PROCEDURE — 3074F SYST BP LT 130 MM HG: CPT | Performed by: OBSTETRICS & GYNECOLOGY

## 2021-11-05 PROCEDURE — 90471 IMMUNIZATION ADMIN: CPT | Performed by: OBSTETRICS & GYNECOLOGY

## 2021-11-05 PROCEDURE — 81002 URINALYSIS NONAUTO W/O SCOPE: CPT | Performed by: OBSTETRICS & GYNECOLOGY

## 2021-11-05 PROCEDURE — 90472 IMMUNIZATION ADMIN EACH ADD: CPT | Performed by: OBSTETRICS & GYNECOLOGY

## 2021-11-05 PROCEDURE — 90715 TDAP VACCINE 7 YRS/> IM: CPT | Performed by: OBSTETRICS & GYNECOLOGY

## 2021-11-05 PROCEDURE — 3078F DIAST BP <80 MM HG: CPT | Performed by: OBSTETRICS & GYNECOLOGY

## 2021-11-05 PROCEDURE — 90686 IIV4 VACC NO PRSV 0.5 ML IM: CPT | Performed by: OBSTETRICS & GYNECOLOGY

## 2021-11-05 NOTE — PROGRESS NOTES
Consent obtained. Flu administered to right deltoid without complication. TDAP administered to left deltoid without complication. VIS provided to patient

## 2021-11-09 ENCOUNTER — TELEPHONE (OUTPATIENT)
Dept: HEMATOLOGY/ONCOLOGY | Facility: HOSPITAL | Age: 31
End: 2021-11-09

## 2021-11-09 NOTE — TELEPHONE ENCOUNTER
----- Message from Dawood Dexter RN sent at 10/13/2021 12:46 PM CDT -----  Regarding: Please schedule  Can you please schedule this pt in 4-6 weeks with labs prior- CBC Iron&Tibc. Please offer outpatient labs or 30 minutes prior to ciara with Dr Geraldo Desouza.   Than

## 2021-11-19 ENCOUNTER — ROUTINE PRENATAL (OUTPATIENT)
Dept: OBGYN CLINIC | Facility: CLINIC | Age: 31
End: 2021-11-19
Payer: COMMERCIAL

## 2021-11-19 VITALS
BODY MASS INDEX: 25 KG/M2 | DIASTOLIC BLOOD PRESSURE: 67 MMHG | WEIGHT: 150 LBS | HEART RATE: 80 BPM | SYSTOLIC BLOOD PRESSURE: 108 MMHG

## 2021-11-19 DIAGNOSIS — Z34.93 ENCOUNTER FOR SUPERVISION OF NORMAL PREGNANCY IN THIRD TRIMESTER, UNSPECIFIED GRAVIDITY: Primary | ICD-10-CM

## 2021-11-19 PROCEDURE — 3078F DIAST BP <80 MM HG: CPT | Performed by: OBSTETRICS & GYNECOLOGY

## 2021-11-19 PROCEDURE — 81002 URINALYSIS NONAUTO W/O SCOPE: CPT | Performed by: OBSTETRICS & GYNECOLOGY

## 2021-11-19 PROCEDURE — 3074F SYST BP LT 130 MM HG: CPT | Performed by: OBSTETRICS & GYNECOLOGY

## 2021-12-03 ENCOUNTER — ROUTINE PRENATAL (OUTPATIENT)
Dept: OBGYN CLINIC | Facility: CLINIC | Age: 31
End: 2021-12-03
Payer: COMMERCIAL

## 2021-12-03 ENCOUNTER — LAB ENCOUNTER (OUTPATIENT)
Dept: LAB | Facility: HOSPITAL | Age: 31
End: 2021-12-03
Attending: OBSTETRICS & GYNECOLOGY
Payer: COMMERCIAL

## 2021-12-03 VITALS
WEIGHT: 151 LBS | DIASTOLIC BLOOD PRESSURE: 65 MMHG | SYSTOLIC BLOOD PRESSURE: 100 MMHG | BODY MASS INDEX: 26 KG/M2 | HEART RATE: 84 BPM

## 2021-12-03 DIAGNOSIS — Z34.93 ENCOUNTER FOR SUPERVISION OF NORMAL PREGNANCY IN THIRD TRIMESTER, UNSPECIFIED GRAVIDITY: Primary | ICD-10-CM

## 2021-12-03 PROBLEM — R87.610 ASCUS OF CERVIX WITH NEGATIVE HIGH RISK HPV: Status: RESOLVED | Noted: 2019-06-06 | Resolved: 2021-12-03

## 2021-12-03 PROCEDURE — 87389 HIV-1 AG W/HIV-1&-2 AB AG IA: CPT | Performed by: OBSTETRICS & GYNECOLOGY

## 2021-12-03 PROCEDURE — 85027 COMPLETE CBC AUTOMATED: CPT | Performed by: OBSTETRICS & GYNECOLOGY

## 2021-12-03 PROCEDURE — 81002 URINALYSIS NONAUTO W/O SCOPE: CPT | Performed by: OBSTETRICS & GYNECOLOGY

## 2021-12-03 PROCEDURE — 36415 COLL VENOUS BLD VENIPUNCTURE: CPT | Performed by: OBSTETRICS & GYNECOLOGY

## 2021-12-03 PROCEDURE — 86780 TREPONEMA PALLIDUM: CPT | Performed by: OBSTETRICS & GYNECOLOGY

## 2021-12-03 PROCEDURE — 3074F SYST BP LT 130 MM HG: CPT | Performed by: OBSTETRICS & GYNECOLOGY

## 2021-12-03 PROCEDURE — 3078F DIAST BP <80 MM HG: CPT | Performed by: OBSTETRICS & GYNECOLOGY

## 2021-12-06 ENCOUNTER — TELEPHONE (OUTPATIENT)
Dept: OBGYN CLINIC | Facility: CLINIC | Age: 31
End: 2021-12-06

## 2021-12-06 ENCOUNTER — PATIENT MESSAGE (OUTPATIENT)
Dept: OBGYN CLINIC | Facility: CLINIC | Age: 31
End: 2021-12-06

## 2021-12-06 NOTE — TELEPHONE ENCOUNTER
Pt loss mucus plug in back,has a dull pain in back contractions are 30 to 45 min apart.  See previous mychart message

## 2021-12-06 NOTE — TELEPHONE ENCOUNTER
From: Beena Garcia  To:  Aleah Hall MD  Sent: 12/6/2021 12:59 PM CST  Subject: Mucus Plug    Gigi Domínguez, I lost my mucus plug today around 12:00 pm. I had been having cramps in my back since Saturday and contractions have been every 30 minutes or so (s

## 2021-12-06 NOTE — TELEPHONE ENCOUNTER
Pt states she lost her mucus plus and is having some cramping in her back. She is having contractions but they are not close or consistent. Pt advised to call when contractions are 10 min apart consistently for an hour and are strong.   Pt also advised to

## 2021-12-09 ENCOUNTER — HOSPITAL ENCOUNTER (OUTPATIENT)
Facility: HOSPITAL | Age: 31
Setting detail: OBSERVATION
Discharge: HOME | End: 2021-12-09
Attending: OBSTETRICS & GYNECOLOGY | Admitting: OBSTETRICS & GYNECOLOGY
Payer: COMMERCIAL

## 2021-12-09 VITALS
HEIGHT: 64 IN | WEIGHT: 152 LBS | HEART RATE: 80 BPM | SYSTOLIC BLOOD PRESSURE: 104 MMHG | BODY MASS INDEX: 25.95 KG/M2 | DIASTOLIC BLOOD PRESSURE: 65 MMHG

## 2021-12-09 PROBLEM — Z34.90 PREGNANCY: Status: ACTIVE | Noted: 2021-12-09

## 2021-12-09 PROBLEM — Z34.90 PREGNANCY (HCC): Status: ACTIVE | Noted: 2021-12-09

## 2021-12-09 PROCEDURE — 59025 FETAL NON-STRESS TEST: CPT | Performed by: OBSTETRICS & GYNECOLOGY

## 2021-12-09 NOTE — TRIAGE
[facility]      Triage Note    Ronna Murphy Patient Status:  Observation    3/8/1990 MRN [id number]   Location [address]  Attending Nolberto Davis MD   Hosp Day # 0 PCP [facility]     Livia Lomax Para: Contraction Frequency: 3-5                   Acoustic Stimulator: No           Nonstress Test Interpretation: Reactive           Nonstress Test Second Interpretation: Reactive                        Additional Comments Comments: Pt in triage

## 2021-12-10 ENCOUNTER — TELEPHONE (OUTPATIENT)
Dept: OBGYN CLINIC | Facility: CLINIC | Age: 31
End: 2021-12-10

## 2021-12-10 ENCOUNTER — ROUTINE PRENATAL (OUTPATIENT)
Dept: OBGYN CLINIC | Facility: CLINIC | Age: 31
End: 2021-12-10
Payer: COMMERCIAL

## 2021-12-10 ENCOUNTER — HOSPITAL ENCOUNTER (INPATIENT)
Facility: HOSPITAL | Age: 31
LOS: 2 days | Discharge: HOME | End: 2021-12-12
Attending: OBSTETRICS & GYNECOLOGY | Admitting: OBSTETRICS & GYNECOLOGY
Payer: COMMERCIAL

## 2021-12-10 VITALS
WEIGHT: 153 LBS | DIASTOLIC BLOOD PRESSURE: 65 MMHG | SYSTOLIC BLOOD PRESSURE: 105 MMHG | BODY MASS INDEX: 26 KG/M2 | HEART RATE: 88 BPM

## 2021-12-10 DIAGNOSIS — Z34.93 ENCOUNTER FOR SUPERVISION OF NORMAL PREGNANCY IN THIRD TRIMESTER, UNSPECIFIED GRAVIDITY: Primary | ICD-10-CM

## 2021-12-10 PROBLEM — O26.93 PREGNANCY RELATED CONDITION IN THIRD TRIMESTER: Status: ACTIVE | Noted: 2021-12-10

## 2021-12-10 PROBLEM — O26.93 PREGNANCY RELATED CONDITION IN THIRD TRIMESTER (HCC): Status: ACTIVE | Noted: 2021-12-10

## 2021-12-10 PROCEDURE — 81002 URINALYSIS NONAUTO W/O SCOPE: CPT | Performed by: OBSTETRICS & GYNECOLOGY

## 2021-12-10 PROCEDURE — 59400 OBSTETRICAL CARE: CPT | Performed by: OBSTETRICS & GYNECOLOGY

## 2021-12-10 PROCEDURE — 3074F SYST BP LT 130 MM HG: CPT | Performed by: OBSTETRICS & GYNECOLOGY

## 2021-12-10 PROCEDURE — 3078F DIAST BP <80 MM HG: CPT | Performed by: OBSTETRICS & GYNECOLOGY

## 2021-12-10 PROCEDURE — 0HQ9XZZ REPAIR PERINEUM SKIN, EXTERNAL APPROACH: ICD-10-PCS | Performed by: OBSTETRICS & GYNECOLOGY

## 2021-12-10 RX ORDER — DEXTROSE, SODIUM CHLORIDE, SODIUM LACTATE, POTASSIUM CHLORIDE, AND CALCIUM CHLORIDE 5; .6; .31; .03; .02 G/100ML; G/100ML; G/100ML; G/100ML; G/100ML
INJECTION, SOLUTION INTRAVENOUS CONTINUOUS
Status: DISCONTINUED | OUTPATIENT
Start: 2021-12-10 | End: 2021-12-11 | Stop reason: HOSPADM

## 2021-12-10 RX ORDER — AMMONIA INHALANTS 0.04 G/.3ML
0.3 INHALANT RESPIRATORY (INHALATION) AS NEEDED
Status: DISCONTINUED | OUTPATIENT
Start: 2021-12-10 | End: 2021-12-12

## 2021-12-10 RX ORDER — TRISODIUM CITRATE DIHYDRATE AND CITRIC ACID MONOHYDRATE 500; 334 MG/5ML; MG/5ML
30 SOLUTION ORAL AS NEEDED
Status: DISCONTINUED | OUTPATIENT
Start: 2021-12-10 | End: 2021-12-11 | Stop reason: HOSPADM

## 2021-12-10 RX ORDER — DIAPER,BRIEF,INFANT-TODD,DISP
1 EACH MISCELLANEOUS EVERY 6 HOURS PRN
Status: DISCONTINUED | OUTPATIENT
Start: 2021-12-10 | End: 2021-12-12

## 2021-12-10 RX ORDER — IBUPROFEN 600 MG/1
600 TABLET ORAL EVERY 6 HOURS PRN
Status: DISCONTINUED | OUTPATIENT
Start: 2021-12-10 | End: 2021-12-10

## 2021-12-10 RX ORDER — ONDANSETRON 2 MG/ML
4 INJECTION INTRAMUSCULAR; INTRAVENOUS EVERY 6 HOURS PRN
Status: DISCONTINUED | OUTPATIENT
Start: 2021-12-10 | End: 2021-12-12

## 2021-12-10 RX ORDER — SIMETHICONE 80 MG
80 TABLET,CHEWABLE ORAL 3 TIMES DAILY PRN
Status: DISCONTINUED | OUTPATIENT
Start: 2021-12-10 | End: 2021-12-12

## 2021-12-10 RX ORDER — ACETAMINOPHEN 500 MG
500 TABLET ORAL EVERY 6 HOURS PRN
Status: DISCONTINUED | OUTPATIENT
Start: 2021-12-10 | End: 2021-12-10

## 2021-12-10 RX ORDER — BISACODYL 10 MG
10 SUPPOSITORY, RECTAL RECTAL ONCE AS NEEDED
Status: DISCONTINUED | OUTPATIENT
Start: 2021-12-10 | End: 2021-12-12

## 2021-12-10 RX ORDER — AMMONIA INHALANTS 0.04 G/.3ML
0.3 INHALANT RESPIRATORY (INHALATION) AS NEEDED
Status: DISCONTINUED | OUTPATIENT
Start: 2021-12-10 | End: 2021-12-10

## 2021-12-10 RX ORDER — TERBUTALINE SULFATE 1 MG/ML
0.25 INJECTION, SOLUTION SUBCUTANEOUS AS NEEDED
Status: DISCONTINUED | OUTPATIENT
Start: 2021-12-10 | End: 2021-12-11 | Stop reason: HOSPADM

## 2021-12-10 RX ORDER — IBUPROFEN 600 MG/1
600 TABLET ORAL EVERY 6 HOURS
Status: DISCONTINUED | OUTPATIENT
Start: 2021-12-10 | End: 2021-12-12

## 2021-12-10 RX ORDER — LIDOCAINE HYDROCHLORIDE 10 MG/ML
30 INJECTION, SOLUTION EPIDURAL; INFILTRATION; INTRACAUDAL; PERINEURAL ONCE
Status: COMPLETED | OUTPATIENT
Start: 2021-12-10 | End: 2021-12-10

## 2021-12-10 RX ORDER — ONDANSETRON 2 MG/ML
4 INJECTION INTRAMUSCULAR; INTRAVENOUS EVERY 6 HOURS PRN
Status: DISCONTINUED | OUTPATIENT
Start: 2021-12-10 | End: 2021-12-10

## 2021-12-10 RX ORDER — DOCUSATE SODIUM 100 MG/1
100 CAPSULE, LIQUID FILLED ORAL
Status: DISCONTINUED | OUTPATIENT
Start: 2021-12-11 | End: 2021-12-12

## 2021-12-10 RX ORDER — SODIUM CHLORIDE, SODIUM LACTATE, POTASSIUM CHLORIDE, CALCIUM CHLORIDE 600; 310; 30; 20 MG/100ML; MG/100ML; MG/100ML; MG/100ML
INJECTION, SOLUTION INTRAVENOUS CONTINUOUS
Status: DISCONTINUED | OUTPATIENT
Start: 2021-12-10 | End: 2021-12-11 | Stop reason: HOSPADM

## 2021-12-10 RX ORDER — ACETAMINOPHEN 325 MG/1
650 TABLET ORAL EVERY 6 HOURS PRN
Status: DISCONTINUED | OUTPATIENT
Start: 2021-12-10 | End: 2021-12-12

## 2021-12-10 NOTE — TELEPHONE ENCOUNTER
Pt was seen in labor yesterday ,  Was released due to early labor ,   Pt has appt thos morning should Pt still come in or continue early labor at home

## 2021-12-10 NOTE — TELEPHONE ENCOUNTER
Prenatal states she was in [facility] yesterday for strong contractions and released. Pt has an appt today and wants to know if she should keep it. Continues with contractions every 15 minutes the last 2 hrs.   Has red spotting and changes panty liner every

## 2021-12-10 NOTE — TELEPHONE ENCOUNTER
pt. states that she just saw Dr Rai Pruitt and did not ask if she is able to take Tylenol when she has a contraction?

## 2021-12-11 NOTE — DISCHARGE SUMMARY
[facility]    Discharge Summary    [facility] Patient Status:  Observation    3/8/1990 MRN [id number]   Location [address] [facility] Attending Leighann Meadows MD   Hosp Day # 0       Admit date:  12/10/2021

## 2021-12-11 NOTE — PLAN OF CARE
Problem: Patient Centered Care  Goal: Patient preferences are identified and integrated in the patient's plan of care  Description: Interventions:  - What would you like us to know as we care for you?  I will need help with circumcision care  - Provide ti

## 2021-12-11 NOTE — PROGRESS NOTES
[facility] - [facility]    OB/Gyne Post  Progress Note      Caty Villasenor Patient Status:  Inpatient    3/8/1990 MRN [id number]   Location [facility] 3SE Attending Marisela Toure MD   Hosp Day # 1 PCP [facility] TYPE A     RH BLOOD TYPE Positive    Antibody Screen    Collection Time: 12/10/21  8:49 PM   Result Value Ref Range    Antibody Screen Negative    CBC W/ DIFFERENTIAL    Collection Time: 12/10/21  8:54 PM   Result Value Ref Range    WBC 15.2 (H) 4.0 - 11. 0

## 2021-12-11 NOTE — PROGRESS NOTES
Patient up to bathroom with assist x 2. Voided 150ml. Patient transferred to mother/baby room 351 per wheelchair in stable condition with baby and personal belongings. Accompanied by significant other and staff. Report given to Abbi Moon.

## 2021-12-11 NOTE — H&P
[facility]    History & Physical    [place] Patient Status:  Observation    3/8/1990 MRN [id number]   Location [address] Attending Reese Chamberlain MD   Hosp Day # 0 PCP Ruth Urias     Date of Ad None N EZEQUIEL       Gyne History: Cervical Papanicolaou to be done in MD's office  , monthly periods, hx of STD: none    Past Medical History:   Past Medical History:   Diagnosis Date   • Beta thalassemia ([facility] [address]    • Chicken pox 1996   • Microcytic anemia 4/20 BILT 0.5 05/20/2013    TP 8.2 05/20/2013    AST 18 05/20/2013    ALT 19 05/20/2013    PTT 27.3 11/27/2012    INR 1.10 11/27/2012    PT 13.9 11/27/2012     05/20/2013    B12 726 10/08/2021       Lab Results   Component Value Date    COLORUR Yellow 11

## 2021-12-11 NOTE — PROGRESS NOTES
Pt is a 32year old female admitted to [address] [place] [address]. Patient presents with:  R/o Labor: contractions since 230 pm     Pt is [id number] 37w6d intra-uterine pregnancy. History obtained, consents signed. Oriented to room, staff, and plan of care.

## 2021-12-11 NOTE — PLAN OF CARE
Problem: Patient Centered Care  Goal: Patient preferences are identified and integrated in the patient's plan of care  Description: Interventions:  - What would you like us to know as we care for you?   - Provide timely, complete, and accurate informatio Notify MD/LIP if interventions unsuccessful or patient reports new pain  - Anticipate increased pain with activity and pre-medicate as appropriate  Outcome: Progressing     Problem: ANXIETY  Goal: Will report anxiety at manageable levels  Description: INTE

## 2021-12-11 NOTE — L&D DELIVERY NOTE
[facility] - [facility]    Vaginal Delivery Note    Fan Niño Patient Status:  Observation    3/8/1990 MRN [id number]   Location [facility] Attending Emory Eng MD   Hosp Day # 0 PCP [facility]     Deliver

## 2021-12-12 VITALS
HEART RATE: 87 BPM | TEMPERATURE: 99 F | SYSTOLIC BLOOD PRESSURE: 109 MMHG | HEIGHT: 64 IN | DIASTOLIC BLOOD PRESSURE: 58 MMHG | RESPIRATION RATE: 16 BRPM | WEIGHT: 154 LBS | BODY MASS INDEX: 26.29 KG/M2

## 2021-12-12 NOTE — PLAN OF CARE
Problem: Patient Centered Care  Goal: Patient preferences are identified and integrated in the patient's plan of care  Description: Interventions:  - What would you like us to know as we care for you?   - Provide timely, complete, and accurate informatio MD/LIP if interventions unsuccessful or patient reports new pain  - Anticipate increased pain with activity and pre-medicate as appropriate  Outcome: Progressing     Problem: ANXIETY  Goal: Will report anxiety at manageable levels  Description: NICOLASO

## 2021-12-12 NOTE — PLAN OF CARE
Problem: Patient Centered Care  Goal: Patient preferences are identified and integrated in the patient's plan of care  Description: Interventions:  - What would you like us to know as we care for you?   - Provide timely, complete, and accurate informatio Assess pain using appropriate pain scale  - Administer analgesics based on type and severity of pain and evaluate response  - Implement non-pharmacological measures as appropriate and evaluate response  - Consider cultural and social influences on pain and

## 2021-12-13 ENCOUNTER — PATIENT OUTREACH (OUTPATIENT)
Dept: CASE MANAGEMENT | Age: 31
End: 2021-12-13

## 2021-12-13 NOTE — PROGRESS NOTES
1st attempt OBGYN Post Partum apt request  (delivered 12/10)    Dr Raine Roman  [address],[facility] [address]  [facility]  [phone]  Follow up 6 weeks  Unable to contact pt (generic vm); will try again  Apt made:  Tue 01/25 @1:40pm  LVM w/apt i

## 2021-12-17 ENCOUNTER — TELEPHONE (OUTPATIENT)
Dept: OBGYN CLINIC | Facility: CLINIC | Age: 31
End: 2021-12-17

## 2021-12-20 NOTE — PROGRESS NOTES
Apt made; pt never answered phone  Closing encounter     Dr Judge Rivera  [address]  [facility]  [phone]  Follow up 6 weeks  Unable to contact pt (generic vm); will try again  Apt made:  Tue 01/25 @1:40pm  ULISES w/marjorie info; wi

## 2021-12-22 ENCOUNTER — TELEPHONE (OUTPATIENT)
Dept: OBGYN CLINIC | Facility: CLINIC | Age: 31
End: 2021-12-22

## 2021-12-22 NOTE — TELEPHONE ENCOUNTER
[employer] disability forms received via fax. No HIPAA The forms emailed and original given to forms dept.

## 2021-12-27 ENCOUNTER — PATIENT MESSAGE (OUTPATIENT)
Dept: OBGYN CLINIC | Facility: CLINIC | Age: 31
End: 2021-12-27

## 2021-12-27 NOTE — TELEPHONE ENCOUNTER
From: Beena Garcia  To:  Marcelo Antonio MD  Sent: 12/27/2021 2:21 PM CST  Subject: Kristen Ceja,  I have Paperwork that needs to be completed as part of a leave of absence request. Who should I send this to?

## 2021-12-28 ENCOUNTER — TELEPHONE (OUTPATIENT)
Dept: OBGYN UNIT | Facility: HOSPITAL | Age: 31
End: 2021-12-28

## 2021-12-29 ENCOUNTER — TELEPHONE (OUTPATIENT)
Dept: OBGYN CLINIC | Facility: CLINIC | Age: 31
End: 2021-12-29

## 2021-12-29 NOTE — TELEPHONE ENCOUNTER
Ref# [id number]  Disability looking for information on delivery. Delivery type & date. Discahrge information.     Please advise

## 2022-01-24 ENCOUNTER — PATIENT MESSAGE (OUTPATIENT)
Dept: OBGYN CLINIC | Facility: CLINIC | Age: 32
End: 2022-01-24

## 2022-01-24 NOTE — TELEPHONE ENCOUNTER
From: Beena Garcia  To: Odalis Stapleton MD  Sent: 1/24/2022 12:43 PM CST  Subject: 6 week pp appt    Hello,  I was originally scheduled for my PP meeting tomorrow. I need to reschedule for an afternoon on either a Thursday or Friday.  I attempted to do

## 2022-02-01 NOTE — TELEPHONE ENCOUNTER
Dr. Kasi Rascon,     Please sign off on 3 form:  **FMLA, disability and return to work form **  -Highlight the patient and hit \"Chart\" button. -In Chart Review, w/in the Encounter tab - click 1 time on the Telephone call encounter for 12/22/21 Scroll down the telephone encounter.  -Click \"scan on\" blue Hyperlink under \"Media\" heading for Disab Dr Kasi Rascon 2/1/22, FMLA Dr Kasi Rascon 2/1/22, RTW Dr Kasi Rascon 2/1/22  w/in the telephone enc.  -Click on Acknowledge button at the bottom right corner and left-click onto image, signature stamp appears and drag signature to Provider signature line. Stamp will turn blue. Close window.      Thank you,    Jorge Alberto Osullivan

## 2022-03-11 ENCOUNTER — TELEPHONE (OUTPATIENT)
Dept: OBGYN CLINIC | Facility: CLINIC | Age: 32
End: 2022-03-11

## 2022-03-11 NOTE — TELEPHONE ENCOUNTER
Pt is calling she missed her post partium  She de;evered  12/10 Insurance will not cover it way past the time she needed to come in ,  , Pt said she will need a medical clearance to return to work .

## 2022-03-11 NOTE — TELEPHONE ENCOUNTER
You can only write note that \"patient delivered on December 10th vaginally & without any complications. Normal recovery time is 6 weeks after such a delivery\"  If pt states she was not offerred appt again after cancelling 1/24 appt, check messages since Isacc Matias did try to reach her to give her times for ciara & pt did read Rashida's message.

## 2022-03-11 NOTE — TELEPHONE ENCOUNTER
Pt delivered on 12/10 with [facility]. PP visit was due 1/21-2/4. Pt canceled her appt on 1/24. Pt is asking for a letter clearing her to return to work. Message to [facility] (delivering doctor). Pt is currently 13wks pp. Should pt made appt for annual?  Can letter be written clearing pt to return to work?

## 2022-03-11 NOTE — TELEPHONE ENCOUNTER
Pt delivered 12/10/22 with [facility] never came in for post partum which was due around 1/20. Pt needs apt to return to work.  Would we schedule post partum

## 2022-03-11 NOTE — TELEPHONE ENCOUNTER
Patient accepts letter as stated by [facility]. She is appreciative. If further clearance is needed, will need to be seen for an exam. She will let us know.    Letter sent

## 2022-06-09 NOTE — PLAN OF CARE
Patient transported to [address], RN  06/09/22 3075 Problem: Patient Centered Care  Goal: Patient preferences are identified and integrated in the patient's plan of care  Description  Interventions:  - What would you like us to know as we care for you?  Wishes to labor naturally  - Provide timely, complete ADULT  Goal: Verbalizes/displays adequate comfort level or patient's stated pain goal  Description  INTERVENTIONS:  - Encourage pt to monitor pain and request assistance  - Assess pain using appropriate pain scale  - Administer analgesics based on type and

## 2022-06-12 NOTE — ADDENDUM NOTE
Addended byAnnie Quarles on: 1/25/2021 05:14 PM     Modules accepted: Orders [facility] : Ans Serv [phone], Office [phone]  Dr Vanegas/Dr Bazzi  /Dr Kingsley patricia /Dr SHERRI Mcfarlane/Dr Jarrell Madden/Dr Andrés Perkins /Dr SUNI Potts  _______________________________________________________________________________________________    seen and examined today for End Stage Renal Disease on Dialysis   Interval :  VITALS:  T(F): 97.9 (06-12 @ 09:45), Max: 99.2 (06-11 @ 22:39)  HR: 67 (06-12 @ 09:45)  BP: 107/49 (06-12 @ 09:45)  ABP: --  RR: 18 (06-12 @ 09:45)  SpO2: 100% (06-12 @ 09:45)    06-11 @ 07:01  -  06-12 @ 07:00  --------------------------------------------------------  IN: 760 mL / OUT: 1400 mL / NET: -640 mL    Physical Exam :-  Constitutional: NAD  Respiratory: Bilateral equal breath sounds, no Crackles present.  Cardiovascular: S1, S2 normal, positive Murmur  Gastrointestinal: Bowel Sounds present, soft  feet- ankles in dressing    Data:-  Allergies :   No Known Allergies    Hospital Medications:   MEDICATIONS  (STANDING):  aspirin  chewable 81 milliGRAM(s) Oral daily  atorvastatin 40 milliGRAM(s) Oral at bedtime  chlorhexidine 2% Cloths 1 Application(s) Topical daily  clopidogrel Tablet 75 milliGRAM(s) Oral daily  collagenase Ointment 1 Application(s) Topical daily  epoetin niesha-epbx (RETACRIT) Injectable 15713 Unit(s) IV Push <User Schedule>  heparin   Injectable 5000 Unit(s) SubCutaneous every 12 hours  meropenem  IVPB 500 milliGRAM(s) IV Intermittent every 24 hours  polyethylene glycol 3350 17 Gram(s) Oral daily  senna 2 Tablet(s) Oral at bedtime  sevelamer carbonate 1600 milliGRAM(s) Oral three times a day with meals    06-12    136  |  91<L>  |  26<H>  ----------------------------<  114<H>  3.8   |  28  |  5.56<H>    Ca    9.3      12 Jun 2022 05:30  Phos  3.3     06-12  Mg     2.40     06-12      Creatinine Trend: 5.56 <--, 7.37 <--, 5.46 <--, 8.39 <--, 6.82 <--, 11.10 <--  egfr trend : 8 <--, 6 <--, 8 <--, 5 <--, 6 <--, 4 <--, 4 <--                        8.7    11.78 )-----------( 290      ( 12 Jun 2022 05:30 )             29.5

## 2023-09-27 ENCOUNTER — TELEPHONE (OUTPATIENT)
Dept: OBGYN CLINIC | Facility: CLINIC | Age: 33
End: 2023-09-27

## 2023-09-27 NOTE — TELEPHONE ENCOUNTER
Pt had a positive HPT earlier this month. [facility] order a quant but pt has not done it yet. Pt states she will get that done tomorrow. Pt informed we need quant results to see how far along she is before an [address] can be ordered. Pt states the cramping she was having before has subsided. Pt denies any bleeding. Pt states she is having nausea, but no vomiting. Pt is able to eat and drink. Pt advised to eat small, frequent meals throughout the day. Pt can try keeping food next to her bed to eat before getting up to help stop nausea from starting. Pt advised high protein foods are good at keeping her full longer. Pt given directions for Vit B6 and Unisom. Pt will try for a few days and call of no help. OBN appt scheduled on 10/12. Pt aware if that ciara will be too soon in pregnancy we will need to reschedule it our further. Pt advised to call with severe cramping or bleeding.

## 2023-10-13 ENCOUNTER — TELEPHONE (OUTPATIENT)
Dept: OBGYN CLINIC | Facility: CLINIC | Age: 33
End: 2023-10-13

## 2023-10-13 NOTE — TELEPHONE ENCOUNTER
Records received from [id number] [facility] ER. Pt admitted for MAB, acute blood loss anemia, microcytic anemia, hypotension. Pt discharged with plan to follow-up with ObGyne within 1 week. Call placed to schedule pt for ER follow-up. Offered with next available provider 10/16 with [facility] at 0800. Pt accepted. Pt doing well at this time. Advised if weakness, fainting, SOB/CP, or irregular HR, needs ER sooner. Patient verbalized understanding. Records to MA bin for review in office.

## 2023-10-16 ENCOUNTER — TELEPHONE (OUTPATIENT)
Dept: OBGYN CLINIC | Facility: CLINIC | Age: 33
End: 2023-10-16

## 2023-10-16 NOTE — TELEPHONE ENCOUNTER
Pt had an appt this Am with [facility] for miscarriage ER f/u but had to cancel to drop off child at school. Pt accepted appt with AMBROSIO on 10/20.

## 2023-10-20 ENCOUNTER — LAB ENCOUNTER (OUTPATIENT)
Dept: LAB | Facility: HOSPITAL | Age: 33
End: 2023-10-20
Attending: OBSTETRICS & GYNECOLOGY

## 2023-10-20 ENCOUNTER — OFFICE VISIT (OUTPATIENT)
Dept: OBGYN CLINIC | Facility: CLINIC | Age: 33
End: 2023-10-20

## 2023-10-20 VITALS
DIASTOLIC BLOOD PRESSURE: 78 MMHG | WEIGHT: 129 LBS | SYSTOLIC BLOOD PRESSURE: 118 MMHG | BODY MASS INDEX: 22 KG/M2 | HEART RATE: 80 BPM

## 2023-10-20 DIAGNOSIS — D62 ACUTE BLOOD LOSS ANEMIA: ICD-10-CM

## 2023-10-20 DIAGNOSIS — Z98.890 STATUS POST ELECTIVE ABORTION: ICD-10-CM

## 2023-10-20 DIAGNOSIS — Z98.890 STATUS POST ELECTIVE ABORTION: Primary | ICD-10-CM

## 2023-10-20 PROBLEM — Z13.79 GENETIC SCREENING: Status: RESOLVED | Noted: 2021-06-30 | Resolved: 2023-10-20

## 2023-10-20 PROBLEM — Z34.90 PREGNANCY (HCC): Status: RESOLVED | Noted: 2021-12-09 | Resolved: 2023-10-20

## 2023-10-20 PROBLEM — Z34.90 PREGNANCY: Status: RESOLVED | Noted: 2021-12-09 | Resolved: 2023-10-20

## 2023-10-20 PROBLEM — O26.93 PREGNANCY RELATED CONDITION IN THIRD TRIMESTER (HCC): Status: RESOLVED | Noted: 2021-12-10 | Resolved: 2023-10-20

## 2023-10-20 PROBLEM — D50.9 MICROCYTIC ANEMIA: Status: RESOLVED | Noted: 2019-04-20 | Resolved: 2023-10-20

## 2023-10-20 PROBLEM — O26.93 PREGNANCY RELATED CONDITION IN THIRD TRIMESTER: Status: RESOLVED | Noted: 2021-12-10 | Resolved: 2023-10-20

## 2023-10-20 LAB
B-HCG SERPL-ACNC: ABNORMAL MIU/ML
DEPRECATED RDW RBC AUTO: 50.3 FL (ref 35.1–46.3)
ERYTHROCYTE [DISTWIDTH] IN BLOOD BY AUTOMATED COUNT: 18.7 % (ref 11–15)
HCT VFR BLD AUTO: 32.1 %
HGB BLD-MCNC: 10.3 G/DL
MCH RBC QN AUTO: 23.9 PG (ref 26–34)
MCHC RBC AUTO-ENTMCNC: 32.1 G/DL (ref 31–37)
MCV RBC AUTO: 74.5 FL
PLATELET # BLD AUTO: 376 10(3)UL (ref 150–450)
RBC # BLD AUTO: 4.31 X10(6)UL
WBC # BLD AUTO: 7.6 X10(3) UL (ref 4–11)

## 2023-10-20 PROCEDURE — 3078F DIAST BP <80 MM HG: CPT | Performed by: OBSTETRICS & GYNECOLOGY

## 2023-10-20 PROCEDURE — 99213 OFFICE O/P EST LOW 20 MIN: CPT | Performed by: OBSTETRICS & GYNECOLOGY

## 2023-10-20 PROCEDURE — 84702 CHORIONIC GONADOTROPIN TEST: CPT

## 2023-10-20 PROCEDURE — 3074F SYST BP LT 130 MM HG: CPT | Performed by: OBSTETRICS & GYNECOLOGY

## 2023-10-20 PROCEDURE — 36415 COLL VENOUS BLD VENIPUNCTURE: CPT

## 2023-10-20 PROCEDURE — 85027 COMPLETE CBC AUTOMATED: CPT

## 2023-10-23 ENCOUNTER — TELEPHONE (OUTPATIENT)
Dept: OBGYN CLINIC | Facility: CLINIC | Age: 33
End: 2023-10-23

## 2023-10-23 DIAGNOSIS — Z33.2 MEDICAL ABORTION: Primary | ICD-10-CM

## 2023-10-23 NOTE — TELEPHONE ENCOUNTER
----- Message from Krissy Johnson MD sent at 10/23/2023 11:29 AM CDT -----  Check quant again as soon as she can & if decreasing trend, then weekly quants justified. Pt s/p recent medical     Pt informed of [facility] recs and verbalized understanding. Pt states she will get quant level done today and we will f/u with next steps. Standing order for quants has already been placed by [facility].

## 2023-10-24 NOTE — TELEPHONE ENCOUNTER
Ok to stop ocp and abstain from intercourse until restarted on a different birth control method Quant not done yet.

## 2023-10-27 ENCOUNTER — LAB ENCOUNTER (OUTPATIENT)
Dept: LAB | Facility: HOSPITAL | Age: 33
End: 2023-10-27
Attending: OBSTETRICS & GYNECOLOGY

## 2023-10-27 DIAGNOSIS — Z98.890 STATUS POST ELECTIVE ABORTION: ICD-10-CM

## 2023-10-27 LAB — B-HCG SERPL-ACNC: 3011 MIU/ML

## 2023-10-27 PROCEDURE — 84702 CHORIONIC GONADOTROPIN TEST: CPT

## 2023-10-27 PROCEDURE — 36415 COLL VENOUS BLD VENIPUNCTURE: CPT

## 2023-12-03 ENCOUNTER — PATIENT MESSAGE (OUTPATIENT)
Dept: OBGYN CLINIC | Facility: CLINIC | Age: 33
End: 2023-12-03

## 2023-12-03 DIAGNOSIS — N93.9 ABNORMAL VAGINAL BLEEDING: Primary | ICD-10-CM

## 2023-12-03 DIAGNOSIS — Z98.890 STATUS POST ELECTIVE ABORTION: ICD-10-CM

## 2023-12-07 ENCOUNTER — LAB ENCOUNTER (OUTPATIENT)
Dept: LAB | Facility: HOSPITAL | Age: 33
End: 2023-12-07
Attending: OBSTETRICS & GYNECOLOGY
Payer: COMMERCIAL

## 2023-12-07 DIAGNOSIS — Z98.890 STATUS POST ELECTIVE ABORTION: ICD-10-CM

## 2023-12-07 LAB — B-HCG SERPL-ACNC: 25.9 MIU/ML

## 2023-12-07 PROCEDURE — 36415 COLL VENOUS BLD VENIPUNCTURE: CPT

## 2023-12-07 PROCEDURE — 84702 CHORIONIC GONADOTROPIN TEST: CPT

## 2023-12-07 NOTE — TELEPHONE ENCOUNTER
Check pelvic ultrasound to make sure no sign of retained products. If they state still with POC, will need D&C to clear cavity out.  Check stat CBC also Recent hcg 25

## 2023-12-08 NOTE — TELEPHONE ENCOUNTER
Pt informed of recs below. Orders placed for US and Stat CBC. Pt advised that her hcg is still not at zero. Pt informed we usually will follow the levels down to zero with weekly blood draws. Order for weekly quants placed as well.

## 2023-12-19 ENCOUNTER — LAB ENCOUNTER (OUTPATIENT)
Dept: LAB | Facility: HOSPITAL | Age: 33
End: 2023-12-19
Attending: OBSTETRICS & GYNECOLOGY
Payer: COMMERCIAL

## 2023-12-19 ENCOUNTER — HOSPITAL ENCOUNTER (OUTPATIENT)
Dept: ULTRASOUND IMAGING | Facility: HOSPITAL | Age: 33
Discharge: HOME | End: 2023-12-19
Attending: OBSTETRICS & GYNECOLOGY
Payer: COMMERCIAL

## 2023-12-19 DIAGNOSIS — Z98.890 STATUS POST ELECTIVE ABORTION: ICD-10-CM

## 2023-12-19 LAB
B-HCG SERPL-ACNC: 6.6 MIU/ML
BASOPHILS # BLD AUTO: 0.02 X10(3) UL (ref 0–0.2)
BASOPHILS NFR BLD AUTO: 0.4 %
DEPRECATED RDW RBC AUTO: 36.3 FL (ref 35.1–46.3)
EOSINOPHIL # BLD AUTO: 0.15 X10(3) UL (ref 0–0.7)
EOSINOPHIL NFR BLD AUTO: 3 %
ERYTHROCYTE [DISTWIDTH] IN BLOOD BY AUTOMATED COUNT: 14 % (ref 11–15)
HCT VFR BLD AUTO: 35.6 %
HGB BLD-MCNC: 11 G/DL
IMM GRANULOCYTES # BLD AUTO: 0.01 X10(3) UL (ref 0–1)
IMM GRANULOCYTES NFR BLD: 0.2 %
LYMPHOCYTES # BLD AUTO: 1.55 X10(3) UL (ref 1–4)
LYMPHOCYTES NFR BLD AUTO: 30.8 %
MCH RBC QN AUTO: 22.4 PG (ref 26–34)
MCHC RBC AUTO-ENTMCNC: 30.9 G/DL (ref 31–37)
MCV RBC AUTO: 72.4 FL
MONOCYTES # BLD AUTO: 0.57 X10(3) UL (ref 0.1–1)
MONOCYTES NFR BLD AUTO: 11.3 %
NEUTROPHILS # BLD AUTO: 2.73 X10 (3) UL (ref 1.5–7.7)
NEUTROPHILS # BLD AUTO: 2.73 X10(3) UL (ref 1.5–7.7)
NEUTROPHILS NFR BLD AUTO: 54.3 %
PLATELET # BLD AUTO: 287 10(3)UL (ref 150–450)
RBC # BLD AUTO: 4.92 X10(6)UL
WBC # BLD AUTO: 5 X10(3) UL (ref 4–11)

## 2023-12-19 PROCEDURE — 36415 COLL VENOUS BLD VENIPUNCTURE: CPT

## 2023-12-19 PROCEDURE — 84702 CHORIONIC GONADOTROPIN TEST: CPT

## 2023-12-19 PROCEDURE — 85025 COMPLETE CBC W/AUTO DIFF WBC: CPT

## 2023-12-19 PROCEDURE — 76856 US EXAM PELVIC COMPLETE: CPT | Performed by: OBSTETRICS & GYNECOLOGY

## 2023-12-19 PROCEDURE — 76830 TRANSVAGINAL US NON-OB: CPT | Performed by: OBSTETRICS & GYNECOLOGY

## 2023-12-22 ENCOUNTER — TELEPHONE (OUTPATIENT)
Dept: OBGYN CLINIC | Facility: CLINIC | Age: 33
End: 2023-12-22

## 2023-12-22 RX ORDER — MEDROXYPROGESTERONE ACETATE 10 MG/1
10 TABLET ORAL DAILY
Qty: 30 TABLET | Refills: 1 | Status: SHIPPED | OUTPATIENT
Start: 2023-12-22

## 2023-12-22 NOTE — TELEPHONE ENCOUNTER
Please schedule the following surgery:    Procedure: operative hysteroscopic currettage    Date: asap w/ me    Diagnosis: retained POC    Admission:Day surgery    Anesth: general    Preop Medical Clearance needed:  No    PCN allergy:  no antibiotic needed    Additional Orders: routine orders    Additional equipment:  Myosure / truclear    Rep needed: Yes    Additional surgery time needed: none    IPA tubal / hyst form signed: not applicable    Comments / Orders to Nurse: provera already sent to pharm for pt to take until surgery done

## 2023-12-22 NOTE — TELEPHONE ENCOUNTER
To phone room please call pt and update insurance information. We need a picture of both front and back of cards uploaded and verified prior to moving forward and scheduling

## 2023-12-28 NOTE — TELEPHONE ENCOUNTER
2nd attempt to reach pt VM box full, and has not read mychart sent on 12/22    Confirmed with [facility] cannot move forward with surgery without updated insurance reflecting that its in network.

## 2023-12-29 NOTE — TELEPHONE ENCOUNTER
LMTCB to coordinate surgery.    Per [facility] will have to wait to call on day 1 of next full flow to coordinate day 7-10 of cycle. Since has already started bleeding and [facility] will be out the office next week.

## 2023-12-30 ENCOUNTER — PATIENT MESSAGE (OUTPATIENT)
Dept: OBGYN CLINIC | Facility: CLINIC | Age: 33
End: 2023-12-30

## 2024-01-02 NOTE — TELEPHONE ENCOUNTER
From: Beena Garcia  To: Krissy Johnson  Sent: 12/30/2023 7:17 PM CST  Subject: Bleeding heavy    Hello Dr Johnson,  I am bleeding quite heavy yesterday and today. Is this possibly a symptom of the medication?  Regards,  Autumn

## 2024-02-14 RX ORDER — MEDROXYPROGESTERONE ACETATE 10 MG/1
10 TABLET ORAL DAILY
Qty: 30 TABLET | Refills: 1 | Status: CANCELLED | OUTPATIENT
Start: 2024-02-14

## 2024-02-23 ENCOUNTER — PATIENT MESSAGE (OUTPATIENT)
Dept: OBGYN CLINIC | Facility: CLINIC | Age: 34
End: 2024-02-23

## 2024-02-23 NOTE — TELEPHONE ENCOUNTER
From: Beena Garcia  To: Krissy Johnson  Sent: 2/23/2024 6:14 AM CST  Subject: Reply back to Delmi    I have bled  Dec 24 - Jan 1  Jan 22-27 Feb 4-18    I just started bleeding again today.

## 2024-02-23 NOTE — TELEPHONE ENCOUNTER
To [facility] to please advise. Pt was given Provera for AUB after SAB. Pt was to call back and schedule operative hysteroscopic currettage for POC. Pt declining procedure but is still c/o AUB. Pt would like Provera.

## 2024-02-26 NOTE — TELEPHONE ENCOUNTER
Declined. This is not how her abnormal vaginal bleeding will be fixed. Give pt an appt to discuss in person. We do not give provera for retained POC

## 2025-03-10 ENCOUNTER — OFFICE VISIT (OUTPATIENT)
Dept: OBGYN CLINIC | Facility: CLINIC | Age: 35
End: 2025-03-10
Payer: COMMERCIAL

## 2025-03-10 VITALS
DIASTOLIC BLOOD PRESSURE: 61 MMHG | WEIGHT: 133 LBS | HEIGHT: 64.5 IN | HEART RATE: 73 BPM | SYSTOLIC BLOOD PRESSURE: 96 MMHG | BODY MASS INDEX: 22.43 KG/M2

## 2025-03-10 DIAGNOSIS — Z12.4 SCREENING FOR CERVICAL CANCER: ICD-10-CM

## 2025-03-10 DIAGNOSIS — Z01.419 ENCOUNTER FOR GYNECOLOGICAL EXAMINATION WITHOUT ABNORMAL FINDING: Primary | ICD-10-CM

## 2025-03-10 PROCEDURE — 87491 CHLMYD TRACH DNA AMP PROBE: CPT | Performed by: OBSTETRICS & GYNECOLOGY

## 2025-03-10 PROCEDURE — 87624 HPV HI-RISK TYP POOLED RSLT: CPT | Performed by: OBSTETRICS & GYNECOLOGY

## 2025-03-10 PROCEDURE — 87661 TRICHOMONAS VAGINALIS AMPLIF: CPT | Performed by: OBSTETRICS & GYNECOLOGY

## 2025-03-10 PROCEDURE — 87591 N.GONORRHOEAE DNA AMP PROB: CPT | Performed by: OBSTETRICS & GYNECOLOGY
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
## 2025-03-10 NOTE — PROGRESS NOTES
The following individual(s) verbally consented to be recorded using ambient AI listening technology and understand that they can each withdraw their consent to this listening technology at any point by asking the clinician to turn off or pause the recording:    Patient name: Beena Garcia  Additional names:

## 2025-03-11 LAB
C TRACH DNA SPEC QL NAA+PROBE: NEGATIVE
HPV E6+E7 MRNA CVX QL NAA+PROBE: NEGATIVE
N GONORRHOEA DNA SPEC QL NAA+PROBE: NEGATIVE

## 2025-03-12 LAB — T VAGINALIS RRNA SPEC QL NAA+PROBE: NEGATIVE

## 2025-03-13 LAB — LAST PAP RESULT: NORMAL

## (undated) NOTE — ED AVS SNAPSHOT
Sohail Alicea   MRN: [id number]    Department:  [facility] Emergency Department   Date of Visit:  4/11/2019           Disclosure     Insurance plans vary and the physician(s) referred by the ER may not be covered by your plan.  Please contac CARE PHYSICIAN AT ONCE OR RETURN IMMEDIATELY TO THE EMERGENCY DEPARTMENT. If you have been prescribed any medication(s), please fill your prescription right away and begin taking the medication(s) as directed.   If you believe that any of the medications

## (undated) NOTE — LETTER
VACCINE ADMINISTRATION RECORD  PARENT / GUARDIAN APPROVAL  Date: 2021  Vaccine administered to:  Alexandra Isaacs     : 3/8/1990    MRN: [id number]    A copy of the appropriate Centers for Disease Control and Prevention Vaccine Information statemen

## (undated) NOTE — LETTER
[facility]  Administration of Anesthesia  1. I, [facility], or _________________________________ acting on her behalf, (Patient) (Dependent/Representative) request to receive anesthesia for my pending procedure/operation/treatment. bleeding, seizure, cardiac arrest and death. 7. AWARENESS: I understand that it is possible (but unlikely) to have explicit memory of events from the operating room while under general anesthesia.   8. ELECTROCONVULSIVE THERAPY PATIENTS: This consent serve below affirms that prior to the time of the procedure, I have explained to the patient and/or his/her guardian, the risks and benefits of undergoing anesthesia, as well as any reasonable alternatives.     ___________________________________________________

## (undated) NOTE — LETTER
3/11/2022              Pankaj Delacruz        [id number] [facility]        [place] [id number]         To whom it may concern,        Beena Garcia  3/8/1990 has been under our supervision for pregnancy and postpartum care. She delivered 12/10/21 vaginally and without any complications. Normal recovery time is 6 weeks after such a delivery.        Sincerely,    Glenda Shafer MD  [address] [place] [address]  [phone]

## (undated) NOTE — LETTER
VACCINE ADMINISTRATION RECORD  PARENT / GUARDIAN APPROVAL  Date: 10/18/2019  Vaccine administered to:  Mane Sutton     : 3/8/1990    MRN: [id number]    A copy of the appropriate Centers for Disease Control and Prevention Vaccine Information stateme